# Patient Record
Sex: MALE | Race: WHITE | NOT HISPANIC OR LATINO
[De-identification: names, ages, dates, MRNs, and addresses within clinical notes are randomized per-mention and may not be internally consistent; named-entity substitution may affect disease eponyms.]

---

## 2018-06-26 PROBLEM — Z82.49 FAMILY HISTORY OF HYPERTENSION: Status: ACTIVE | Noted: 2018-06-26

## 2018-06-26 PROBLEM — Z83.42 FAMILY HISTORY OF HYPERCHOLESTEROLEMIA: Status: ACTIVE | Noted: 2018-06-26

## 2018-06-26 PROBLEM — E78.5 HYPERLIPIDEMIA: Status: ACTIVE | Noted: 2018-06-26

## 2018-06-26 PROBLEM — Z83.3 FAMILY HISTORY OF DIABETES MELLITUS: Status: ACTIVE | Noted: 2018-06-26

## 2018-06-26 PROBLEM — I10 HYPERTENSION: Status: ACTIVE | Noted: 2018-06-26

## 2018-06-26 PROBLEM — Z82.49 FAMILY HISTORY OF DISSECTING AORTIC ANEURYSM: Status: ACTIVE | Noted: 2018-06-26

## 2018-06-26 RX ORDER — ASPIRIN 325 MG/1
325 TABLET, FILM COATED ORAL DAILY
Refills: 0 | Status: ACTIVE | COMMUNITY

## 2018-06-26 RX ORDER — TADALAFIL 5 MG/1
5 TABLET, FILM COATED ORAL
Qty: 30 | Refills: 0 | Status: DISCONTINUED | COMMUNITY
Start: 2017-06-23 | End: 2018-06-26

## 2018-06-26 RX ORDER — AZITHROMYCIN 250 MG/1
250 TABLET, FILM COATED ORAL
Qty: 6 | Refills: 0 | Status: DISCONTINUED | COMMUNITY
Start: 2017-12-28

## 2018-06-26 RX ORDER — METOPROLOL SUCCINATE 200 MG/1
200 TABLET, EXTENDED RELEASE ORAL
Qty: 15 | Refills: 0 | Status: DISCONTINUED | COMMUNITY
Start: 2017-12-14

## 2018-06-26 RX ORDER — ROSUVASTATIN CALCIUM 40 MG/1
40 TABLET, FILM COATED ORAL
Qty: 30 | Refills: 0 | Status: DISCONTINUED | COMMUNITY
Start: 2017-12-12

## 2018-06-26 RX ORDER — TADALAFIL 5 MG/1
5 TABLET, FILM COATED ORAL
Qty: 30 | Refills: 0 | Status: DISCONTINUED | COMMUNITY
Start: 2017-06-23

## 2018-06-26 RX ORDER — AMLODIPINE BESYLATE 5 MG/1
5 TABLET ORAL
Qty: 30 | Refills: 0 | Status: DISCONTINUED | COMMUNITY
Start: 2018-05-07

## 2018-06-26 RX ORDER — ROSUVASTATIN CALCIUM 40 MG/1
40 TABLET, FILM COATED ORAL
Qty: 30 | Refills: 0 | Status: ACTIVE | COMMUNITY
Start: 2017-12-12

## 2018-06-26 RX ORDER — MULTIVITAMIN
TABLET ORAL DAILY
Refills: 0 | Status: ACTIVE | COMMUNITY

## 2018-06-26 RX ORDER — EZETIMIBE 10 MG/1
10 TABLET ORAL
Qty: 30 | Refills: 0 | Status: DISCONTINUED | COMMUNITY
Start: 2017-06-27

## 2018-06-28 ENCOUNTER — APPOINTMENT (OUTPATIENT)
Dept: CARDIOTHORACIC SURGERY | Facility: CLINIC | Age: 58
End: 2018-06-28
Payer: COMMERCIAL

## 2018-06-28 ENCOUNTER — OUTPATIENT (OUTPATIENT)
Dept: OUTPATIENT SERVICES | Facility: HOSPITAL | Age: 58
LOS: 1 days | End: 2018-06-28
Payer: COMMERCIAL

## 2018-06-28 VITALS
WEIGHT: 246 LBS | HEART RATE: 58 BPM | DIASTOLIC BLOOD PRESSURE: 82 MMHG | SYSTOLIC BLOOD PRESSURE: 148 MMHG | RESPIRATION RATE: 16 BRPM | BODY MASS INDEX: 29.05 KG/M2 | HEIGHT: 77 IN | TEMPERATURE: 98.5 F | OXYGEN SATURATION: 98 %

## 2018-06-28 VITALS — DIASTOLIC BLOOD PRESSURE: 79 MMHG | SYSTOLIC BLOOD PRESSURE: 150 MMHG

## 2018-06-28 VITALS
DIASTOLIC BLOOD PRESSURE: 82 MMHG | RESPIRATION RATE: 15 BRPM | OXYGEN SATURATION: 98 % | SYSTOLIC BLOOD PRESSURE: 150 MMHG | HEIGHT: 77 IN | HEART RATE: 58 BPM | WEIGHT: 242.07 LBS | TEMPERATURE: 99 F

## 2018-06-28 DIAGNOSIS — Z29.9 ENCOUNTER FOR PROPHYLACTIC MEASURES, UNSPECIFIED: ICD-10-CM

## 2018-06-28 DIAGNOSIS — Z83.3 FAMILY HISTORY OF DIABETES MELLITUS: ICD-10-CM

## 2018-06-28 DIAGNOSIS — Z60.2 PROBLEMS RELATED TO LIVING ALONE: ICD-10-CM

## 2018-06-28 DIAGNOSIS — I10 ESSENTIAL (PRIMARY) HYPERTENSION: ICD-10-CM

## 2018-06-28 DIAGNOSIS — Z83.42 FAMILY HISTORY OF FAMILIAL HYPERCHOLESTEROLEMIA: ICD-10-CM

## 2018-06-28 DIAGNOSIS — E78.5 HYPERLIPIDEMIA, UNSPECIFIED: ICD-10-CM

## 2018-06-28 DIAGNOSIS — I20.9 ANGINA PECTORIS, UNSPECIFIED: ICD-10-CM

## 2018-06-28 DIAGNOSIS — Z82.49 FAMILY HISTORY OF ISCHEMIC HEART DISEASE AND OTHER DISEASES OF THE CIRCULATORY SYSTEM: ICD-10-CM

## 2018-06-28 DIAGNOSIS — I25.10 ATHEROSCLEROTIC HEART DISEASE OF NATIVE CORONARY ARTERY WITHOUT ANGINA PECTORIS: ICD-10-CM

## 2018-06-28 LAB
ANION GAP SERPL CALC-SCNC: 13 MMOL/L — SIGNIFICANT CHANGE UP (ref 5–17)
BLD GP AB SCN SERPL QL: NEGATIVE — SIGNIFICANT CHANGE UP
BUN SERPL-MCNC: 17 MG/DL — SIGNIFICANT CHANGE UP (ref 7–23)
CALCIUM SERPL-MCNC: 9.8 MG/DL — SIGNIFICANT CHANGE UP (ref 8.4–10.5)
CHLORIDE SERPL-SCNC: 101 MMOL/L — SIGNIFICANT CHANGE UP (ref 96–108)
CO2 SERPL-SCNC: 29 MMOL/L — SIGNIFICANT CHANGE UP (ref 22–31)
CREAT SERPL-MCNC: 1.2 MG/DL — SIGNIFICANT CHANGE UP (ref 0.5–1.3)
GLUCOSE SERPL-MCNC: 85 MG/DL — SIGNIFICANT CHANGE UP (ref 70–99)
HCT VFR BLD CALC: 46.6 % — SIGNIFICANT CHANGE UP (ref 39–50)
HGB BLD-MCNC: 16.3 G/DL — SIGNIFICANT CHANGE UP (ref 13–17)
MCHC RBC-ENTMCNC: 30.6 PG — SIGNIFICANT CHANGE UP (ref 27–34)
MCHC RBC-ENTMCNC: 35 GM/DL — SIGNIFICANT CHANGE UP (ref 32–36)
MCV RBC AUTO: 87.4 FL — SIGNIFICANT CHANGE UP (ref 80–100)
PLATELET # BLD AUTO: 240 K/UL — SIGNIFICANT CHANGE UP (ref 150–400)
POTASSIUM SERPL-MCNC: 4.4 MMOL/L — SIGNIFICANT CHANGE UP (ref 3.5–5.3)
POTASSIUM SERPL-SCNC: 4.4 MMOL/L — SIGNIFICANT CHANGE UP (ref 3.5–5.3)
RBC # BLD: 5.33 M/UL — SIGNIFICANT CHANGE UP (ref 4.2–5.8)
RBC # FLD: 12.9 % — SIGNIFICANT CHANGE UP (ref 10.3–14.5)
RH IG SCN BLD-IMP: POSITIVE — SIGNIFICANT CHANGE UP
SODIUM SERPL-SCNC: 143 MMOL/L — SIGNIFICANT CHANGE UP (ref 135–145)
WBC # BLD: 7.26 K/UL — SIGNIFICANT CHANGE UP (ref 3.8–10.5)
WBC # FLD AUTO: 7.26 K/UL — SIGNIFICANT CHANGE UP (ref 3.8–10.5)

## 2018-06-28 PROCEDURE — 80048 BASIC METABOLIC PNL TOTAL CA: CPT

## 2018-06-28 PROCEDURE — 86901 BLOOD TYPING SEROLOGIC RH(D): CPT

## 2018-06-28 PROCEDURE — 71046 X-RAY EXAM CHEST 2 VIEWS: CPT | Mod: 26

## 2018-06-28 PROCEDURE — 99205 OFFICE O/P NEW HI 60 MIN: CPT

## 2018-06-28 PROCEDURE — 86850 RBC ANTIBODY SCREEN: CPT

## 2018-06-28 PROCEDURE — 87640 STAPH A DNA AMP PROBE: CPT

## 2018-06-28 PROCEDURE — 87641 MR-STAPH DNA AMP PROBE: CPT

## 2018-06-28 PROCEDURE — 83036 HEMOGLOBIN GLYCOSYLATED A1C: CPT

## 2018-06-28 PROCEDURE — 71046 X-RAY EXAM CHEST 2 VIEWS: CPT

## 2018-06-28 PROCEDURE — G0463: CPT

## 2018-06-28 PROCEDURE — 85027 COMPLETE CBC AUTOMATED: CPT

## 2018-06-28 PROCEDURE — 86900 BLOOD TYPING SEROLOGIC ABO: CPT

## 2018-06-28 RX ORDER — FLUTICASONE PROPIONATE 50 UG/1
50 SPRAY, METERED NASAL
Qty: 16 | Refills: 0 | Status: DISCONTINUED | COMMUNITY
Start: 2018-01-22 | End: 2018-06-28

## 2018-06-28 SDOH — SOCIAL STABILITY - SOCIAL INSECURITY: PROBLEMS RELATED TO LIVING ALONE: Z60.2

## 2018-06-28 NOTE — H&P PST ADULT - PMH
GERD (gastroesophageal reflux disease)    HLD (hyperlipidemia)    HTN (hypertension) Atherosclerotic heart disease of native coronary artery without angina pectoris    GERD (gastroesophageal reflux disease)    HLD (hyperlipidemia)    HTN (hypertension)

## 2018-06-28 NOTE — H&P PST ADULT - HISTORY OF PRESENT ILLNESS
58 y/o male with 58 y/o male with PMHx of HTN, HLD, 58 y/o male with PMHx of HTN, HLD, GERD c/o slight chest burning and SOB while walking uphill about 1 month ago. s/p cardiac catheterization revealed TVD. Presents to PST for a scheduled CABG x 3 on 7/3/2018.

## 2018-06-28 NOTE — H&P PST ADULT - PROBLEM SELECTOR PLAN 1
Scheduled CABG x 3 on 7/3/2018  CBC, BMP, A1c, nasal swab, T&S sent at [facility]  Pt to stop Aspirin 325mg 1wk pre-op per Dr. Sims, last dose 6/27  CXR completed today  Pre-op instructions given to pt including chlorhexidene soap Scheduled CABG x 3 on 7/3/2018  CBC, BMP, A1c, nasal swab, T&S sent at [facility]  Pt to stop Aspirin 325mg 1wk pre-op per Dr. Sims, last dose 6/27  CXR completed today  Recent EKG from 6/18 reviewed and in chart- Sinus Bradycardia  Pre-op instructions given to pt including chlorhexidene soap  FS DOS

## 2018-06-28 NOTE — H&P PST ADULT - ASSESSMENT
CAPRINI SCORE [CLOT]    AGE RELATED RISK FACTORS                                                       MOBILITY RELATED FACTORS  [x] Age 41-60 years                                            (1 Point)                  [ ] Bed rest                                                        (1 Point)  [ ] Age: 61-74 years                                           (2 Points)                 [ ] Plaster cast                                                   (2 Points)  [ ] Age= 75 years                                              (3 Points)                 [ ] Bed bound for more than 72 hours                 (2 Points)    DISEASE RELATED RISK FACTORS                                               GENDER SPECIFIC FACTORS  [ ] Edema in the lower extremities                       (1 Point)                  [ ] Pregnancy                                                     (1 Point)  [ ] Varicose veins                                               (1 Point)                  [ ] Post-partum < 6 weeks                                   (1 Point)             [x] BMI > 25 Kg/m2                                            (1 Point)                  [ ] Hormonal therapy  or oral contraception          (1 Point)                 [ ] Sepsis (in the previous month)                        (1 Point)                  [ ] History of pregnancy complications                 (1 point)  [ ] Pneumonia or serious lung disease                                               [ ] Unexplained or recurrent                     (1 Point)           (in the previous month)                               (1 Point)  [ ] Abnormal pulmonary function test                     (1 Point)                 SURGERY RELATED RISK FACTORS  [ ] Acute myocardial infarction                              (1 Point)                 [ ]  Section                                             (1 Point)  [ ] Congestive heart failure (in the previous month)  (1 Point)               [ ] Minor surgery                                                  (1 Point)   [ ] Inflammatory bowel disease                             (1 Point)                 [ ] Arthroscopic surgery                                        (2 Points)  [ ] Central venous access                                      (2 Points)                [x] General surgery lasting more than 45 minutes   (2 Points)       [ ] Stroke (in the previous month)                          (5 Points)               [ ] Elective arthroplasty                                         (5 Points)                                                                                                                                               HEMATOLOGY RELATED FACTORS                                                 TRAUMA RELATED RISK FACTORS  [ ] Prior episodes of VTE                                     (3 Points)                [ ] Fracture of the hip, pelvis, or leg                       (5 Points)  [ ] Positive family history for VTE                         (3 Points)                 [ ] Acute spinal cord injury (in the previous month)  (5 Points)  [ ] Prothrombin 32954 A                                     (3 Points)                 [ ] Paralysis  (less than 1 month)                             (5 Points)  [ ] Factor V Leiden                                             (3 Points)                  [ ] Multiple Trauma within 1 month                        (5 Points)  [ ] Lupus anticoagulants                                     (3 Points)                                                           [ ] Anticardiolipin antibodies                               (3 Points)                                                       [ ] High homocysteine in the blood                      (3 Points)                                             [ ] Other congenital or acquired thrombophilia      (3 Points)                                                [ ] Heparin induced thrombocytopenia                  (3 Points)                                          Total Score [  4    ]

## 2018-06-28 NOTE — H&P PST ADULT - PROBLEM SELECTOR PLAN 2
Instructed pt to c/w antihypertensives pre-op Instructed pt to c/w antihypertensives pre-operatively

## 2018-06-28 NOTE — H&P PST ADULT - NSANTHOSAYNRD_GEN_A_CORE
No. HARSHA screening performed.  STOP BANG Legend: 0-2 = LOW Risk; 3-4 = INTERMEDIATE Risk; 5-8 = HIGH Risk

## 2018-06-29 ENCOUNTER — TRANSCRIPTION ENCOUNTER (OUTPATIENT)
Age: 58
End: 2018-06-29

## 2018-06-29 LAB
HBA1C BLD-MCNC: 5.6 % — SIGNIFICANT CHANGE UP (ref 4–5.6)
MRSA PCR RESULT.: SIGNIFICANT CHANGE UP
S AUREUS DNA NOSE QL NAA+PROBE: SIGNIFICANT CHANGE UP

## 2018-07-02 PROBLEM — I20.9 ANGINA PECTORIS: Status: ACTIVE | Noted: 2018-06-26

## 2018-07-03 ENCOUNTER — INPATIENT (INPATIENT)
Facility: HOSPITAL | Age: 58
LOS: 6 days | Discharge: HOME | DRG: 235 | End: 2018-07-10
Attending: THORACIC SURGERY (CARDIOTHORACIC VASCULAR SURGERY) | Admitting: THORACIC SURGERY (CARDIOTHORACIC VASCULAR SURGERY)
Payer: COMMERCIAL

## 2018-07-03 ENCOUNTER — APPOINTMENT (OUTPATIENT)
Dept: CARDIOTHORACIC SURGERY | Facility: HOSPITAL | Age: 58
End: 2018-07-03

## 2018-07-03 VITALS
TEMPERATURE: 98 F | WEIGHT: 241.19 LBS | OXYGEN SATURATION: 98 % | DIASTOLIC BLOOD PRESSURE: 66 MMHG | SYSTOLIC BLOOD PRESSURE: 120 MMHG | RESPIRATION RATE: 18 BRPM | HEART RATE: 54 BPM | HEIGHT: 77 IN

## 2018-07-03 DIAGNOSIS — I25.10 ATHEROSCLEROTIC HEART DISEASE OF NATIVE CORONARY ARTERY WITHOUT ANGINA PECTORIS: ICD-10-CM

## 2018-07-03 LAB
ALBUMIN SERPL ELPH-MCNC: 3.8 G/DL — SIGNIFICANT CHANGE UP (ref 3.3–5)
ALP SERPL-CCNC: 57 U/L — SIGNIFICANT CHANGE UP (ref 40–120)
ALT FLD-CCNC: 30 U/L — SIGNIFICANT CHANGE UP (ref 10–45)
ANION GAP SERPL CALC-SCNC: 16 MMOL/L — SIGNIFICANT CHANGE UP (ref 5–17)
APTT BLD: 28.6 SEC — SIGNIFICANT CHANGE UP (ref 27.5–37.4)
AST SERPL-CCNC: 32 U/L — SIGNIFICANT CHANGE UP (ref 10–40)
BASOPHILS # BLD AUTO: 0 K/UL — SIGNIFICANT CHANGE UP (ref 0–0.2)
BASOPHILS NFR BLD AUTO: 0 % — SIGNIFICANT CHANGE UP (ref 0–2)
BILIRUB SERPL-MCNC: 2.4 MG/DL — HIGH (ref 0.2–1.2)
BUN SERPL-MCNC: 15 MG/DL — SIGNIFICANT CHANGE UP (ref 7–23)
CALCIUM SERPL-MCNC: 9.5 MG/DL — SIGNIFICANT CHANGE UP (ref 8.4–10.5)
CHLORIDE SERPL-SCNC: 101 MMOL/L — SIGNIFICANT CHANGE UP (ref 96–108)
CK MB BLD-MCNC: 5.9 % — HIGH (ref 0–3.5)
CK MB CFR SERPL CALC: 11.1 NG/ML — HIGH (ref 0–6.7)
CK SERPL-CCNC: 187 U/L — SIGNIFICANT CHANGE UP (ref 30–200)
CO2 SERPL-SCNC: 23 MMOL/L — SIGNIFICANT CHANGE UP (ref 22–31)
CREAT SERPL-MCNC: 1.02 MG/DL — SIGNIFICANT CHANGE UP (ref 0.5–1.3)
EOSINOPHIL # BLD AUTO: 0.1 K/UL — SIGNIFICANT CHANGE UP (ref 0–0.5)
EOSINOPHIL NFR BLD AUTO: 3 % — SIGNIFICANT CHANGE UP (ref 0–6)
FIBRINOGEN PPP-MCNC: 301 MG/DL — LOW (ref 310–510)
GAS PNL BLDA: SIGNIFICANT CHANGE UP
GLUCOSE BLDC GLUCOMTR-MCNC: 119 MG/DL — HIGH (ref 70–99)
GLUCOSE BLDC GLUCOMTR-MCNC: 131 MG/DL — HIGH (ref 70–99)
GLUCOSE BLDC GLUCOMTR-MCNC: 175 MG/DL — HIGH (ref 70–99)
GLUCOSE SERPL-MCNC: 168 MG/DL — HIGH (ref 70–99)
HCT VFR BLD CALC: 37.4 % — LOW (ref 39–50)
HGB BLD-MCNC: 13.8 G/DL — SIGNIFICANT CHANGE UP (ref 13–17)
INR BLD: 1.26 RATIO — HIGH (ref 0.88–1.16)
LYMPHOCYTES # BLD AUTO: 1.4 K/UL — SIGNIFICANT CHANGE UP (ref 1–3.3)
LYMPHOCYTES # BLD AUTO: 10 % — LOW (ref 13–44)
MAGNESIUM SERPL-MCNC: 2.3 MG/DL — SIGNIFICANT CHANGE UP (ref 1.6–2.6)
MCHC RBC-ENTMCNC: 32.5 PG — SIGNIFICANT CHANGE UP (ref 27–34)
MCHC RBC-ENTMCNC: 36.9 GM/DL — HIGH (ref 32–36)
MCV RBC AUTO: 88.1 FL — SIGNIFICANT CHANGE UP (ref 80–100)
MONOCYTES # BLD AUTO: 0.4 K/UL — SIGNIFICANT CHANGE UP (ref 0–0.9)
MONOCYTES NFR BLD AUTO: 2 % — SIGNIFICANT CHANGE UP (ref 2–14)
NEUTROPHILS # BLD AUTO: 9.1 K/UL — HIGH (ref 1.8–7.4)
NEUTROPHILS NFR BLD AUTO: 80 % — HIGH (ref 43–77)
PHOSPHATE SERPL-MCNC: 1.4 MG/DL — LOW (ref 2.5–4.5)
PLATELET # BLD AUTO: 165 K/UL — SIGNIFICANT CHANGE UP (ref 150–400)
POTASSIUM SERPL-MCNC: 3.7 MMOL/L — SIGNIFICANT CHANGE UP (ref 3.5–5.3)
POTASSIUM SERPL-SCNC: 3.7 MMOL/L — SIGNIFICANT CHANGE UP (ref 3.5–5.3)
PROT SERPL-MCNC: 5.6 G/DL — LOW (ref 6–8.3)
PROTHROM AB SERPL-ACNC: 13.7 SEC — HIGH (ref 9.8–12.7)
RBC # BLD: 4.25 M/UL — SIGNIFICANT CHANGE UP (ref 4.2–5.8)
RBC # FLD: 11.6 % — SIGNIFICANT CHANGE UP (ref 10.3–14.5)
SODIUM SERPL-SCNC: 140 MMOL/L — SIGNIFICANT CHANGE UP (ref 135–145)
TROPONIN T, HIGH SENSITIVITY RESULT: 202 NG/L — HIGH (ref 0–51)
WBC # BLD: 10.8 K/UL — HIGH (ref 3.8–10.5)
WBC # FLD AUTO: 10.8 K/UL — HIGH (ref 3.8–10.5)

## 2018-07-03 PROCEDURE — 93010 ELECTROCARDIOGRAM REPORT: CPT

## 2018-07-03 PROCEDURE — 33534 CABG ARTERIAL TWO: CPT

## 2018-07-03 PROCEDURE — 99291 CRITICAL CARE FIRST HOUR: CPT

## 2018-07-03 PROCEDURE — 71045 X-RAY EXAM CHEST 1 VIEW: CPT | Mod: 26

## 2018-07-03 PROCEDURE — 33534 CABG ARTERIAL TWO: CPT | Mod: AS

## 2018-07-03 RX ORDER — ACETAMINOPHEN 500 MG
650 TABLET ORAL EVERY 6 HOURS
Qty: 0 | Refills: 0 | Status: DISCONTINUED | OUTPATIENT
Start: 2018-07-03 | End: 2018-07-03

## 2018-07-03 RX ORDER — METOCLOPRAMIDE HCL 10 MG
10 TABLET ORAL EVERY 8 HOURS
Qty: 0 | Refills: 0 | Status: DISCONTINUED | OUTPATIENT
Start: 2018-07-03 | End: 2018-07-03

## 2018-07-03 RX ORDER — ASPIRIN/CALCIUM CARB/MAGNESIUM 324 MG
81 TABLET ORAL DAILY
Qty: 0 | Refills: 0 | Status: DISCONTINUED | OUTPATIENT
Start: 2018-07-03 | End: 2018-07-03

## 2018-07-03 RX ORDER — POTASSIUM CHLORIDE 20 MEQ
10 PACKET (EA) ORAL
Qty: 0 | Refills: 0 | Status: DISCONTINUED | OUTPATIENT
Start: 2018-07-03 | End: 2018-07-03

## 2018-07-03 RX ORDER — OMEPRAZOLE 10 MG/1
1 CAPSULE, DELAYED RELEASE ORAL
Qty: 0 | Refills: 0 | COMMUNITY

## 2018-07-03 RX ORDER — CHLORHEXIDINE GLUCONATE 213 G/1000ML
5 SOLUTION TOPICAL EVERY 4 HOURS
Qty: 0 | Refills: 0 | Status: DISCONTINUED | OUTPATIENT
Start: 2018-07-03 | End: 2018-07-03

## 2018-07-03 RX ORDER — ASPIRIN/CALCIUM CARB/MAGNESIUM 324 MG
300 TABLET ORAL ONCE
Qty: 0 | Refills: 0 | Status: DISCONTINUED | OUTPATIENT
Start: 2018-07-03 | End: 2018-07-04

## 2018-07-03 RX ORDER — DEXTROSE 50 % IN WATER 50 %
25 SYRINGE (ML) INTRAVENOUS
Qty: 0 | Refills: 0 | Status: DISCONTINUED | OUTPATIENT
Start: 2018-07-03 | End: 2018-07-05

## 2018-07-03 RX ORDER — MEPERIDINE HYDROCHLORIDE 50 MG/ML
25 INJECTION INTRAMUSCULAR; INTRAVENOUS; SUBCUTANEOUS ONCE
Qty: 0 | Refills: 0 | Status: DISCONTINUED | OUTPATIENT
Start: 2018-07-03 | End: 2018-07-03

## 2018-07-03 RX ORDER — SODIUM CHLORIDE 9 MG/ML
1000 INJECTION INTRAMUSCULAR; INTRAVENOUS; SUBCUTANEOUS
Qty: 0 | Refills: 0 | Status: DISCONTINUED | OUTPATIENT
Start: 2018-07-03 | End: 2018-07-05

## 2018-07-03 RX ORDER — SODIUM CHLORIDE 9 MG/ML
500 INJECTION, SOLUTION INTRAVENOUS ONCE
Qty: 0 | Refills: 0 | Status: COMPLETED | OUTPATIENT
Start: 2018-07-03 | End: 2018-07-03

## 2018-07-03 RX ORDER — MEPERIDINE HYDROCHLORIDE 50 MG/ML
25 INJECTION INTRAMUSCULAR; INTRAVENOUS; SUBCUTANEOUS ONCE
Qty: 0 | Refills: 0 | Status: DISCONTINUED | OUTPATIENT
Start: 2018-07-03 | End: 2018-07-04

## 2018-07-03 RX ORDER — POTASSIUM CHLORIDE 20 MEQ
10 PACKET (EA) ORAL
Qty: 0 | Refills: 0 | Status: DISCONTINUED | OUTPATIENT
Start: 2018-07-03 | End: 2018-07-04

## 2018-07-03 RX ORDER — HYDROMORPHONE HYDROCHLORIDE 2 MG/ML
30 INJECTION INTRAMUSCULAR; INTRAVENOUS; SUBCUTANEOUS
Qty: 0 | Refills: 0 | Status: DISCONTINUED | OUTPATIENT
Start: 2018-07-03 | End: 2018-07-05

## 2018-07-03 RX ORDER — LIDOCAINE HCL 20 MG/ML
0.2 VIAL (ML) INJECTION ONCE
Qty: 0 | Refills: 0 | Status: DISCONTINUED | OUTPATIENT
Start: 2018-07-03 | End: 2018-07-03

## 2018-07-03 RX ORDER — DEXTROSE 50 % IN WATER 50 %
50 SYRINGE (ML) INTRAVENOUS
Qty: 0 | Refills: 0 | Status: DISCONTINUED | OUTPATIENT
Start: 2018-07-03 | End: 2018-07-03

## 2018-07-03 RX ORDER — ALBUMIN HUMAN 25 %
250 VIAL (ML) INTRAVENOUS ONCE
Qty: 0 | Refills: 0 | Status: COMPLETED | OUTPATIENT
Start: 2018-07-03 | End: 2018-07-03

## 2018-07-03 RX ORDER — POTASSIUM CHLORIDE 20 MEQ
10 PACKET (EA) ORAL
Qty: 0 | Refills: 0 | Status: COMPLETED | OUTPATIENT
Start: 2018-07-03 | End: 2018-07-03

## 2018-07-03 RX ORDER — DEXTROSE 50 % IN WATER 50 %
25 SYRINGE (ML) INTRAVENOUS
Qty: 0 | Refills: 0 | Status: DISCONTINUED | OUTPATIENT
Start: 2018-07-03 | End: 2018-07-03

## 2018-07-03 RX ORDER — NICARDIPINE HYDROCHLORIDE 30 MG/1
1 CAPSULE, EXTENDED RELEASE ORAL
Qty: 40 | Refills: 0 | Status: DISCONTINUED | OUTPATIENT
Start: 2018-07-03 | End: 2018-07-04

## 2018-07-03 RX ORDER — SODIUM CHLORIDE 9 MG/ML
1000 INJECTION INTRAMUSCULAR; INTRAVENOUS; SUBCUTANEOUS
Qty: 0 | Refills: 0 | Status: DISCONTINUED | OUTPATIENT
Start: 2018-07-03 | End: 2018-07-03

## 2018-07-03 RX ORDER — DOCUSATE SODIUM 100 MG
100 CAPSULE ORAL THREE TIMES A DAY
Qty: 0 | Refills: 0 | Status: DISCONTINUED | OUTPATIENT
Start: 2018-07-03 | End: 2018-07-05

## 2018-07-03 RX ORDER — HYDROMORPHONE HYDROCHLORIDE 2 MG/ML
0.5 INJECTION INTRAMUSCULAR; INTRAVENOUS; SUBCUTANEOUS
Qty: 0 | Refills: 0 | Status: DISCONTINUED | OUTPATIENT
Start: 2018-07-03 | End: 2018-07-05

## 2018-07-03 RX ORDER — CEFUROXIME AXETIL 250 MG
1500 TABLET ORAL ONCE
Qty: 0 | Refills: 0 | Status: DISCONTINUED | OUTPATIENT
Start: 2018-07-03 | End: 2018-07-03

## 2018-07-03 RX ORDER — POTASSIUM PHOSPHATE, MONOBASIC POTASSIUM PHOSPHATE, DIBASIC 236; 224 MG/ML; MG/ML
15 INJECTION, SOLUTION INTRAVENOUS ONCE
Qty: 0 | Refills: 0 | Status: COMPLETED | OUTPATIENT
Start: 2018-07-03 | End: 2018-07-03

## 2018-07-03 RX ORDER — EZETIMIBE 10 MG/1
1 TABLET ORAL
Qty: 0 | Refills: 0 | COMMUNITY

## 2018-07-03 RX ORDER — INSULIN HUMAN 100 [IU]/ML
1 INJECTION, SOLUTION SUBCUTANEOUS
Qty: 100 | Refills: 0 | Status: DISCONTINUED | OUTPATIENT
Start: 2018-07-03 | End: 2018-07-04

## 2018-07-03 RX ORDER — FENTANYL CITRATE 50 UG/ML
50 INJECTION INTRAVENOUS ONCE
Qty: 0 | Refills: 0 | Status: DISCONTINUED | OUTPATIENT
Start: 2018-07-03 | End: 2018-07-03

## 2018-07-03 RX ORDER — PANTOPRAZOLE SODIUM 20 MG/1
40 TABLET, DELAYED RELEASE ORAL DAILY
Qty: 0 | Refills: 0 | Status: DISCONTINUED | OUTPATIENT
Start: 2018-07-03 | End: 2018-07-04

## 2018-07-03 RX ORDER — ROSUVASTATIN CALCIUM 5 MG/1
1 TABLET ORAL
Qty: 0 | Refills: 0 | COMMUNITY

## 2018-07-03 RX ORDER — DEXTROSE 50 % IN WATER 50 %
50 SYRINGE (ML) INTRAVENOUS
Qty: 0 | Refills: 0 | Status: DISCONTINUED | OUTPATIENT
Start: 2018-07-03 | End: 2018-07-05

## 2018-07-03 RX ORDER — SODIUM CHLORIDE 9 MG/ML
3 INJECTION INTRAMUSCULAR; INTRAVENOUS; SUBCUTANEOUS EVERY 8 HOURS
Qty: 0 | Refills: 0 | Status: DISCONTINUED | OUTPATIENT
Start: 2018-07-03 | End: 2018-07-03

## 2018-07-03 RX ORDER — PANTOPRAZOLE SODIUM 20 MG/1
40 TABLET, DELAYED RELEASE ORAL DAILY
Qty: 0 | Refills: 0 | Status: DISCONTINUED | OUTPATIENT
Start: 2018-07-03 | End: 2018-07-03

## 2018-07-03 RX ORDER — MAGNESIUM SULFATE 500 MG/ML
1 VIAL (ML) INJECTION ONCE
Qty: 0 | Refills: 0 | Status: COMPLETED | OUTPATIENT
Start: 2018-07-03 | End: 2018-07-03

## 2018-07-03 RX ORDER — DOCUSATE SODIUM 100 MG
100 CAPSULE ORAL THREE TIMES A DAY
Qty: 0 | Refills: 0 | Status: DISCONTINUED | OUTPATIENT
Start: 2018-07-03 | End: 2018-07-03

## 2018-07-03 RX ORDER — DEXMEDETOMIDINE HYDROCHLORIDE IN 0.9% SODIUM CHLORIDE 4 UG/ML
0.1 INJECTION INTRAVENOUS
Qty: 200 | Refills: 0 | Status: DISCONTINUED | OUTPATIENT
Start: 2018-07-03 | End: 2018-07-04

## 2018-07-03 RX ORDER — CEFUROXIME AXETIL 250 MG
1500 TABLET ORAL EVERY 8 HOURS
Qty: 0 | Refills: 0 | Status: COMPLETED | OUTPATIENT
Start: 2018-07-03 | End: 2018-07-05

## 2018-07-03 RX ORDER — METOCLOPRAMIDE HCL 10 MG
10 TABLET ORAL EVERY 8 HOURS
Qty: 0 | Refills: 0 | Status: DISCONTINUED | OUTPATIENT
Start: 2018-07-03 | End: 2018-07-04

## 2018-07-03 RX ORDER — NALOXONE HYDROCHLORIDE 4 MG/.1ML
0.1 SPRAY NASAL
Qty: 0 | Refills: 0 | Status: DISCONTINUED | OUTPATIENT
Start: 2018-07-03 | End: 2018-07-05

## 2018-07-03 RX ORDER — ONDANSETRON 8 MG/1
4 TABLET, FILM COATED ORAL EVERY 6 HOURS
Qty: 0 | Refills: 0 | Status: DISCONTINUED | OUTPATIENT
Start: 2018-07-03 | End: 2018-07-05

## 2018-07-03 RX ORDER — ASPIRIN/CALCIUM CARB/MAGNESIUM 324 MG
325 TABLET ORAL DAILY
Qty: 0 | Refills: 0 | Status: DISCONTINUED | OUTPATIENT
Start: 2018-07-03 | End: 2018-07-05

## 2018-07-03 RX ORDER — OXYCODONE HYDROCHLORIDE 5 MG/1
5 TABLET ORAL EVERY 6 HOURS
Qty: 0 | Refills: 0 | Status: DISCONTINUED | OUTPATIENT
Start: 2018-07-03 | End: 2018-07-03

## 2018-07-03 RX ORDER — CHLORHEXIDINE GLUCONATE 213 G/1000ML
5 SOLUTION TOPICAL EVERY 4 HOURS
Qty: 0 | Refills: 0 | Status: DISCONTINUED | OUTPATIENT
Start: 2018-07-03 | End: 2018-07-04

## 2018-07-03 RX ADMIN — SODIUM CHLORIDE 10 MILLILITER(S): 9 INJECTION INTRAMUSCULAR; INTRAVENOUS; SUBCUTANEOUS at 14:49

## 2018-07-03 RX ADMIN — POTASSIUM PHOSPHATE, MONOBASIC POTASSIUM PHOSPHATE, DIBASIC 62.5 MILLIMOLE(S): 236; 224 INJECTION, SOLUTION INTRAVENOUS at 20:26

## 2018-07-03 RX ADMIN — Medication 100 MILLIEQUIVALENT(S): at 17:40

## 2018-07-03 RX ADMIN — Medication 100 MILLIGRAM(S): at 18:04

## 2018-07-03 RX ADMIN — FENTANYL CITRATE 50 MICROGRAM(S): 50 INJECTION INTRAVENOUS at 20:00

## 2018-07-03 RX ADMIN — Medication 125 MILLILITER(S): at 15:20

## 2018-07-03 RX ADMIN — Medication 100 GRAM(S): at 16:38

## 2018-07-03 RX ADMIN — Medication 100 MILLIEQUIVALENT(S): at 15:40

## 2018-07-03 RX ADMIN — Medication 100 MILLIEQUIVALENT(S): at 17:10

## 2018-07-03 RX ADMIN — NICARDIPINE HYDROCHLORIDE 5 MG/HR: 30 CAPSULE, EXTENDED RELEASE ORAL at 16:00

## 2018-07-03 RX ADMIN — Medication 325 MILLIGRAM(S): at 21:31

## 2018-07-03 RX ADMIN — Medication 125 MILLILITER(S): at 15:07

## 2018-07-03 RX ADMIN — SODIUM CHLORIDE 1500 MILLILITER(S): 9 INJECTION, SOLUTION INTRAVENOUS at 16:15

## 2018-07-03 RX ADMIN — Medication 100 MILLIEQUIVALENT(S): at 15:10

## 2018-07-03 RX ADMIN — Medication 100 MILLIEQUIVALENT(S): at 18:40

## 2018-07-03 RX ADMIN — CHLORHEXIDINE GLUCONATE 5 MILLILITER(S): 213 SOLUTION TOPICAL at 18:05

## 2018-07-03 RX ADMIN — Medication 100 MILLIGRAM(S): at 21:31

## 2018-07-03 RX ADMIN — PANTOPRAZOLE SODIUM 40 MILLIGRAM(S): 20 TABLET, DELAYED RELEASE ORAL at 16:11

## 2018-07-03 RX ADMIN — FENTANYL CITRATE 50 MICROGRAM(S): 50 INJECTION INTRAVENOUS at 19:45

## 2018-07-03 RX ADMIN — SODIUM CHLORIDE 3000 MILLILITER(S): 9 INJECTION, SOLUTION INTRAVENOUS at 14:55

## 2018-07-03 RX ADMIN — DEXMEDETOMIDINE HYDROCHLORIDE IN 0.9% SODIUM CHLORIDE 2.73 MICROGRAM(S)/KG/HR: 4 INJECTION INTRAVENOUS at 15:59

## 2018-07-03 RX ADMIN — Medication 100 MILLIEQUIVALENT(S): at 16:10

## 2018-07-03 RX ADMIN — INSULIN HUMAN 1 UNIT(S)/HR: 100 INJECTION, SOLUTION SUBCUTANEOUS at 15:30

## 2018-07-03 RX ADMIN — HYDROMORPHONE HYDROCHLORIDE 30 MILLILITER(S): 2 INJECTION INTRAMUSCULAR; INTRAVENOUS; SUBCUTANEOUS at 21:54

## 2018-07-03 RX ADMIN — Medication 100 MILLIEQUIVALENT(S): at 19:09

## 2018-07-03 RX ADMIN — Medication 100 MILLIEQUIVALENT(S): at 16:40

## 2018-07-03 RX ADMIN — SODIUM CHLORIDE 3000 MILLILITER(S): 9 INJECTION, SOLUTION INTRAVENOUS at 18:18

## 2018-07-03 RX ADMIN — Medication 125 MILLILITER(S): at 18:58

## 2018-07-03 RX ADMIN — Medication 100 MILLIEQUIVALENT(S): at 19:48

## 2018-07-03 NOTE — PRE-OP CHECKLIST - 1.
emotional support provided to patient and family, pre op instruction and orientation to procedure provided to patient and family emotional support provided to patient, pre op instruction and orientation to procedure provided to patient

## 2018-07-03 NOTE — AIRWAY REMOVAL NOTE  ADULT & PEDS - ARTIFICAL AIRWAY REMOVAL COMMENTS
Written order for extubation verified. The patient was identified by full name and birth date compared to the identification band. Present during the procedure was Rocio MANZANO

## 2018-07-03 NOTE — PROGRESS NOTE ADULT - SUBJECTIVE AND OBJECTIVE BOX
STEVO YARBROUGH   MRN#: [id number]     The patient is a 57y Male who was seen, evaluated, & examined with the CTICU staff on rounds and later in the day with a multidisciplinary care plan formulated & implemented.  All available clinical, laboratory, radiographic, pharmacologic, and electrocardiographic data were reviewed & analyzed.      The patient was in the CTICU in critical condition secondary to acute hypoxic respiratory failure-persistent cardiopulmonary dysfunction, hemodynamically significant hypovolemia-shock, hyperlactatemia-acidosis, & stress hyperglycemia.      Respiratory failure required full ventilatory support, the following of ABG’s with A-line monitoring, continuous pulse oximetry monitoring, and an IV Propofol infusion for support & to evaluate for & prevent further decompensation secondary to persistent cardiopulmonary dysfunction.     Invasive hemodynamic monitoring with an A-line was required for the following of continuous MAP/BP monitoring to ensure adequate cardiovascular support and to evaluate for & help prevent decompensation while receiving intermittent volume expansion and an IV Levophed drip secondary to hemodynamically significant hypovolemia-shock and hyperlactatemia-acidosis.       Metabolic stability, stress hyperglycemia, & infection prophylaxis required an IV regular Insulin drip & the following of serial glucose levels to help achieve & maintain euglycemia.      Patient required critical care management and I provided 30 minutes of non-continuous care to the patient.  Discussed at length with the CTICU staff and helped coordinate care.

## 2018-07-03 NOTE — BRIEF OPERATIVE NOTE - PROCEDURE
<<-----Click on this checkbox to enter Procedure CABG  07/03/2018  CABGx2 (MARIA ELENA-LAD, [facility])  [facility]

## 2018-07-03 NOTE — PATIENT PROFILE ADULT. - PMH
Atherosclerotic heart disease of native coronary artery without angina pectoris    GERD (gastroesophageal reflux disease)    HLD (hyperlipidemia)    HTN (hypertension)

## 2018-07-04 DIAGNOSIS — Z95.1 PRESENCE OF AORTOCORONARY BYPASS GRAFT: ICD-10-CM

## 2018-07-04 LAB
ALBUMIN SERPL ELPH-MCNC: 4.3 G/DL — SIGNIFICANT CHANGE UP (ref 3.3–5)
ALP SERPL-CCNC: 45 U/L — SIGNIFICANT CHANGE UP (ref 40–120)
ALT FLD-CCNC: 28 U/L — SIGNIFICANT CHANGE UP (ref 10–45)
ANION GAP SERPL CALC-SCNC: 18 MMOL/L — HIGH (ref 5–17)
APTT BLD: 29.6 SEC — SIGNIFICANT CHANGE UP (ref 27.5–37.4)
AST SERPL-CCNC: 34 U/L — SIGNIFICANT CHANGE UP (ref 10–40)
BILIRUB SERPL-MCNC: 3.3 MG/DL — HIGH (ref 0.2–1.2)
BUN SERPL-MCNC: 13 MG/DL — SIGNIFICANT CHANGE UP (ref 7–23)
CALCIUM SERPL-MCNC: 9.3 MG/DL — SIGNIFICANT CHANGE UP (ref 8.4–10.5)
CHLORIDE SERPL-SCNC: 102 MMOL/L — SIGNIFICANT CHANGE UP (ref 96–108)
CO2 SERPL-SCNC: 21 MMOL/L — LOW (ref 22–31)
CREAT SERPL-MCNC: 1.03 MG/DL — SIGNIFICANT CHANGE UP (ref 0.5–1.3)
GAS PNL BLDA: SIGNIFICANT CHANGE UP
GLUCOSE BLDC GLUCOMTR-MCNC: 106 MG/DL — HIGH (ref 70–99)
GLUCOSE BLDC GLUCOMTR-MCNC: 107 MG/DL — HIGH (ref 70–99)
GLUCOSE BLDC GLUCOMTR-MCNC: 107 MG/DL — HIGH (ref 70–99)
GLUCOSE BLDC GLUCOMTR-MCNC: 109 MG/DL — HIGH (ref 70–99)
GLUCOSE BLDC GLUCOMTR-MCNC: 112 MG/DL — HIGH (ref 70–99)
GLUCOSE BLDC GLUCOMTR-MCNC: 117 MG/DL — HIGH (ref 70–99)
GLUCOSE BLDC GLUCOMTR-MCNC: 124 MG/DL — HIGH (ref 70–99)
GLUCOSE BLDC GLUCOMTR-MCNC: 129 MG/DL — HIGH (ref 70–99)
GLUCOSE BLDC GLUCOMTR-MCNC: 131 MG/DL — HIGH (ref 70–99)
GLUCOSE BLDC GLUCOMTR-MCNC: 132 MG/DL — HIGH (ref 70–99)
GLUCOSE BLDC GLUCOMTR-MCNC: 139 MG/DL — HIGH (ref 70–99)
GLUCOSE BLDC GLUCOMTR-MCNC: 142 MG/DL — HIGH (ref 70–99)
GLUCOSE BLDC GLUCOMTR-MCNC: 148 MG/DL — HIGH (ref 70–99)
GLUCOSE BLDC GLUCOMTR-MCNC: 149 MG/DL — HIGH (ref 70–99)
GLUCOSE BLDC GLUCOMTR-MCNC: 156 MG/DL — HIGH (ref 70–99)
GLUCOSE BLDC GLUCOMTR-MCNC: 96 MG/DL — SIGNIFICANT CHANGE UP (ref 70–99)
GLUCOSE BLDC GLUCOMTR-MCNC: 98 MG/DL — SIGNIFICANT CHANGE UP (ref 70–99)
GLUCOSE SERPL-MCNC: 96 MG/DL — SIGNIFICANT CHANGE UP (ref 70–99)
HCT VFR BLD CALC: 35.2 % — LOW (ref 39–50)
HGB BLD-MCNC: 12.9 G/DL — LOW (ref 13–17)
INR BLD: 1.22 RATIO — HIGH (ref 0.88–1.16)
MAGNESIUM SERPL-MCNC: 1.9 MG/DL — SIGNIFICANT CHANGE UP (ref 1.6–2.6)
MCHC RBC-ENTMCNC: 32.7 PG — SIGNIFICANT CHANGE UP (ref 27–34)
MCHC RBC-ENTMCNC: 36.6 GM/DL — HIGH (ref 32–36)
MCV RBC AUTO: 89.2 FL — SIGNIFICANT CHANGE UP (ref 80–100)
PHOSPHATE SERPL-MCNC: 2.1 MG/DL — LOW (ref 2.5–4.5)
PLATELET # BLD AUTO: 162 K/UL — SIGNIFICANT CHANGE UP (ref 150–400)
POTASSIUM SERPL-MCNC: 4.1 MMOL/L — SIGNIFICANT CHANGE UP (ref 3.5–5.3)
POTASSIUM SERPL-SCNC: 4.1 MMOL/L — SIGNIFICANT CHANGE UP (ref 3.5–5.3)
PROT SERPL-MCNC: 6.2 G/DL — SIGNIFICANT CHANGE UP (ref 6–8.3)
PROTHROM AB SERPL-ACNC: 13.2 SEC — HIGH (ref 9.8–12.7)
RBC # BLD: 3.95 M/UL — LOW (ref 4.2–5.8)
RBC # FLD: 11.6 % — SIGNIFICANT CHANGE UP (ref 10.3–14.5)
SODIUM SERPL-SCNC: 141 MMOL/L — SIGNIFICANT CHANGE UP (ref 135–145)
TSH SERPL-MCNC: 1.02 UIU/ML — SIGNIFICANT CHANGE UP (ref 0.27–4.2)
WBC # BLD: 13.7 K/UL — HIGH (ref 3.8–10.5)
WBC # FLD AUTO: 13.7 K/UL — HIGH (ref 3.8–10.5)

## 2018-07-04 PROCEDURE — 99292 CRITICAL CARE ADDL 30 MIN: CPT

## 2018-07-04 PROCEDURE — 99291 CRITICAL CARE FIRST HOUR: CPT

## 2018-07-04 PROCEDURE — 71045 X-RAY EXAM CHEST 1 VIEW: CPT | Mod: 26

## 2018-07-04 PROCEDURE — 93010 ELECTROCARDIOGRAM REPORT: CPT

## 2018-07-04 RX ORDER — POLYETHYLENE GLYCOL 3350 17 G/17G
17 POWDER, FOR SOLUTION ORAL DAILY
Qty: 0 | Refills: 0 | Status: DISCONTINUED | OUTPATIENT
Start: 2018-07-04 | End: 2018-07-10

## 2018-07-04 RX ORDER — INSULIN LISPRO 100/ML
VIAL (ML) SUBCUTANEOUS
Qty: 0 | Refills: 0 | Status: DISCONTINUED | OUTPATIENT
Start: 2018-07-04 | End: 2018-07-05

## 2018-07-04 RX ORDER — ATORVASTATIN CALCIUM 80 MG/1
40 TABLET, FILM COATED ORAL AT BEDTIME
Qty: 0 | Refills: 0 | Status: DISCONTINUED | OUTPATIENT
Start: 2018-07-04 | End: 2018-07-10

## 2018-07-04 RX ORDER — ENOXAPARIN SODIUM 100 MG/ML
40 INJECTION SUBCUTANEOUS DAILY
Qty: 0 | Refills: 0 | Status: DISCONTINUED | OUTPATIENT
Start: 2018-07-04 | End: 2018-07-10

## 2018-07-04 RX ORDER — PANTOPRAZOLE SODIUM 20 MG/1
40 TABLET, DELAYED RELEASE ORAL
Qty: 0 | Refills: 0 | Status: DISCONTINUED | OUTPATIENT
Start: 2018-07-04 | End: 2018-07-10

## 2018-07-04 RX ORDER — METOPROLOL TARTRATE 50 MG
25 TABLET ORAL
Qty: 0 | Refills: 0 | Status: DISCONTINUED | OUTPATIENT
Start: 2018-07-04 | End: 2018-07-05

## 2018-07-04 RX ORDER — METOCLOPRAMIDE HCL 10 MG
10 TABLET ORAL EVERY 8 HOURS
Qty: 0 | Refills: 0 | Status: COMPLETED | OUTPATIENT
Start: 2018-07-04 | End: 2018-07-06

## 2018-07-04 RX ADMIN — Medication 100 MILLIGRAM(S): at 13:30

## 2018-07-04 RX ADMIN — Medication 2: at 16:55

## 2018-07-04 RX ADMIN — INSULIN HUMAN 1 UNIT(S)/HR: 100 INJECTION, SOLUTION SUBCUTANEOUS at 00:26

## 2018-07-04 RX ADMIN — Medication 100 MILLIGRAM(S): at 22:16

## 2018-07-04 RX ADMIN — Medication 104 MILLIGRAM(S): at 22:30

## 2018-07-04 RX ADMIN — HYDROMORPHONE HYDROCHLORIDE 30 MILLILITER(S): 2 INJECTION INTRAMUSCULAR; INTRAVENOUS; SUBCUTANEOUS at 07:16

## 2018-07-04 RX ADMIN — Medication 104 MILLIGRAM(S): at 05:39

## 2018-07-04 RX ADMIN — Medication 100 MILLIGRAM(S): at 16:55

## 2018-07-04 RX ADMIN — PANTOPRAZOLE SODIUM 40 MILLIGRAM(S): 20 TABLET, DELAYED RELEASE ORAL at 09:16

## 2018-07-04 RX ADMIN — Medication 25 MILLIGRAM(S): at 17:25

## 2018-07-04 RX ADMIN — Medication 25 MILLIGRAM(S): at 09:16

## 2018-07-04 RX ADMIN — Medication 10 MILLIGRAM(S): at 22:16

## 2018-07-04 RX ADMIN — Medication 10 MILLIGRAM(S): at 13:30

## 2018-07-04 RX ADMIN — Medication 325 MILLIGRAM(S): at 13:30

## 2018-07-04 RX ADMIN — Medication 100 MILLIGRAM(S): at 05:39

## 2018-07-04 RX ADMIN — Medication 100 MILLIGRAM(S): at 09:16

## 2018-07-04 RX ADMIN — Medication 100 MILLIGRAM(S): at 00:52

## 2018-07-04 RX ADMIN — HYDROMORPHONE HYDROCHLORIDE 30 MILLILITER(S): 2 INJECTION INTRAMUSCULAR; INTRAVENOUS; SUBCUTANEOUS at 18:25

## 2018-07-04 RX ADMIN — ENOXAPARIN SODIUM 40 MILLIGRAM(S): 100 INJECTION SUBCUTANEOUS at 13:30

## 2018-07-04 RX ADMIN — ATORVASTATIN CALCIUM 40 MILLIGRAM(S): 80 TABLET, FILM COATED ORAL at 22:16

## 2018-07-04 RX ADMIN — POLYETHYLENE GLYCOL 3350 17 GRAM(S): 17 POWDER, FOR SOLUTION ORAL at 13:30

## 2018-07-04 NOTE — PHYSICAL THERAPY INITIAL EVALUATION ADULT - PLANNED THERAPY INTERVENTIONS, PT EVAL
GOAL: Stair Negotiation Training: Patient will be able to negotiate up & down 1 flight of stairs with bilateral rails, step to gait pattern, in 2 weeks./bed mobility training/strengthening/balance training/transfer training/gait training

## 2018-07-04 NOTE — PROGRESS NOTE ADULT - SUBJECTIVE AND OBJECTIVE BOX
Patient discussed on morning rounds with Dr. Sims    Operation / Date: POD 1 C3B     SUBJECTIVE ASSESSMENT:  57y Male PMH CAD, HTN, HLD, GERD now POD 1 C3B    Vital Signs Last 24 Hrs  T(C): 36.4 (04 Jul 2018 23:00), Max: 37.9 (04 Jul 2018 03:00)  T(F): 97.6 (04 Jul 2018 23:00), Max: 100.2 (04 Jul 2018 03:00)  HR: 68 (04 Jul 2018 23:00) (57 - 88)  BP: 119/57 (04 Jul 2018 18:33) (90/50 - 135/61)  BP(mean): 79 (04 Jul 2018 17:00) (65 - 91)  RR: 18 (04 Jul 2018 23:00) (7 - 28)  SpO2: 96% (04 Jul 2018 23:00) (96% - 100%)  I&O's Detail    03 Jul 2018 07:01  -  04 Jul 2018 07:00  --------------------------------------------------------  IN:    Albumin 5%  - 250 mL: 750 mL    dexmedetomidine Infusion: 16.4 mL    insulin Infusion: 54.5 mL    IV PiggyBack: 900 mL    Lactated Ringers IV Bolus: 1500 mL    Oral Fluid: 120 mL    sodium chloride 0.9%.: 170 mL  Total IN: 3510.9 mL    OUT:    Chest Tube: 350 mL    Chest Tube: 130 mL    Chest Tube: 40 mL    Indwelling Catheter - Urethral: 3245 mL    Nasoenteral Tube: 140 mL  Total OUT: 3905 mL    Total NET: -394.1 mL      04 Jul 2018 07:01  -  04 Jul 2018 23:05  --------------------------------------------------------  IN:    insulin Infusion: 21 mL    IV PiggyBack: 100 mL    sodium chloride 0.9%.: 110 mL  Total IN: 231 mL    OUT:    Chest Tube: 20 mL    Chest Tube: 40 mL    Chest Tube: 50 mL    Voided: 650 mL  Total OUT: 760 mL    Total NET: -529 mL    CHEST TUBE:  Yes, B/L pleurals  EPICARDIAL WIRES: Yes  TIE DOWNS: No  CALLAHAN: No    General: NAD  HEENT:  NC/AT  Neuro: A&Ox4, speech clear, no focal deficits noted  Respiratory: B/L BS CTA, no wheeze, no rhonchi, no crackles noted  Cardiovascular: RRR, normal S1S2, no murmur noted  GI: Abd soft, NT/ND, +BSx4Q +BM  Peripheral Vascular:  B/L LE neg edema, 2+ peripheral pulses, no clubbing, cyanosis, varicosities/PVD noted  Musculoskeletal: B/L UE and LE 5/5 strength   Psychiatric: Normal mood, normal affect observed  Skin: Normal exam to inspection and palpation  Incision: MSI c/d/i + stable     LABS:                        12.9   13.7  )-----------( 162      ( 04 Jul 2018 03:00 )             35.2       COUMADIN: No    PT/INR - ( 04 Jul 2018 03:00 )   PT: 13.2 sec;   INR: 1.22 ratio         PTT - ( 04 Jul 2018 03:00 )  PTT:29.6 sec    07-04    141  |  102  |  13  ----------------------------<  96  4.1   |  21<L>  |  1.03    Ca    9.3      04 Jul 2018 03:00  Phos  2.1     07-04  Mg     1.9     07-04    TPro  6.2  /  Alb  4.3  /  TBili  3.3<H>  /  DBili  x   /  AST  34  /  ALT  28  /  AlkPhos  45  07-04      MEDICATIONS  (STANDING):  aspirin enteric coated 325 milliGRAM(s) Oral daily  atorvastatin 40 milliGRAM(s) Oral at bedtime  cefuroxime  IVPB 1500 milliGRAM(s) IV Intermittent every 8 hours  dextrose 50% Injectable 50 milliLiter(s) IV Push every 15 minutes  dextrose 50% Injectable 25 milliLiter(s) IV Push every 15 minutes  docusate sodium 100 milliGRAM(s) Oral three times a day  enoxaparin Injectable 40 milliGRAM(s) SubCutaneous daily  HYDROmorphone PCA (1 mG/mL) 30 milliLiter(s) PCA Continuous PCA Continuous  insulin lispro (HumaLOG) corrective regimen sliding scale   SubCutaneous three times a day before meals  metoclopramide Injectable 10 milliGRAM(s) IV Push every 8 hours  metoprolol tartrate 25 milliGRAM(s) Oral two times a day  pantoprazole    Tablet 40 milliGRAM(s) Oral before breakfast  polyethylene glycol 3350 17 Gram(s) Oral daily  sodium chloride 0.9%. 1000 milliLiter(s) (10 mL/Hr) IV Continuous <Continuous>    MEDICATIONS  (PRN):  HYDROmorphone PCA (1 mG/mL) Rescue Clinician Bolus 0.5 milliGRAM(s) IV Push every 15 minutes PRN for Pain Scale GREATER THAN 6  naloxone Injectable 0.1 milliGRAM(s) IV Push every 3 minutes PRN For ANY of the following changes in patient status:  A. RR LESS THAN 10 breaths per minute, B. Oxygen saturation LESS THAN 90%, C. Sedation score of 6  ondansetron Injectable 4 milliGRAM(s) IV Push every 6 hours PRN Nausea        RADIOLOGY & ADDITIONAL TESTS:

## 2018-07-04 NOTE — PHYSICAL THERAPY INITIAL EVALUATION ADULT - ADDITIONAL COMMENTS
Pt states he lives with his aunt in a split level ranch house with 1 steps to enter and 7 steps inside, +HR. Pt states he is independent with all ADLs and ambulation. Pt states he does not use a AD for ambulation. Pt states his aunt is available to assist when needed. Pt states he wears reading glasses and drives.

## 2018-07-04 NOTE — CONSULT NOTE ADULT - SUBJECTIVE AND OBJECTIVE BOX
CHIEF COMPLAINT:Patient is a 57y old  Male who presents with a chief complaint of "CABG x 3" (03 Jul 2018 07:03)      HISTORY OF PRESENT ILLNESS:HPI:  56 y/o male with PMHx of HTN, HLD, GERD c/o slight chest burning and SOB while walking uphill about 1 month ago. s/p cardiac catheterization revealed TVD. Presents to [facility] for a scheduled CABG x 3 on 7/3/2018.  Now s/p CABG with 2 mammary arteries      PAST MEDICAL & SURGICAL HISTORY:  Atherosclerotic heart disease of native coronary artery without angina pectoris  GERD (gastroesophageal reflux disease)  HLD (hyperlipidemia)  HTN (hypertension)  No significant past surgical history          MEDICATIONS:  enoxaparin Injectable 40 milliGRAM(s) SubCutaneous daily        HYDROmorphone   Tablet 2 milliGRAM(s) Oral every 3 hours PRN  HYDROmorphone   Tablet 4 milliGRAM(s) Oral every 3 hours PRN  metoclopramide Injectable 10 milliGRAM(s) IV Push every 8 hours    pantoprazole    Tablet 40 milliGRAM(s) Oral before breakfast  polyethylene glycol 3350 17 Gram(s) Oral daily    atorvastatin 40 milliGRAM(s) Oral at bedtime        FAMILY HISTORY:      Non-contributory    SOCIAL HISTORY:    not an active smoker  Allergies    No Known Allergies    Intolerances    	    REVIEW OF SYSTEMS:  CONSTITUTIONAL: No fever  EYES: No eye pain, visual disturbances, or discharge  ENMT:  No difficulty hearing, tinnitus  NECK: No pain or stiffness  RESPIRATORY: No cough, wheezing,  CARDIOVASCULAR: + chest incisional pain, no palpitations, passing out, dizziness, or leg swelling  GASTROINTESTINAL:  No nausea, vomiting, diarrhea or constipation. No melena.  GENITOURINARY: No dysuria, hematuria  NEUROLOGICAL: No stroke like symptoms  SKIN: No burning or lesions   LYMPH Nodes: No enlarged glands  ENDOCRINE: No heat or cold intolerance  MUSCULOSKELETAL: No joint pain or swelling  PSYCHIATRIC: No  anxiety, mood swings  HEME/LYMPH: No bleeding gums  ALLERY AND IMMUNOLOGIC: No hives or eczema	    All other ROS negative    PHYSICAL EXAM:  T(C): 37 (07-05-18 @ 20:01), Max: 37 (07-05-18 @ 20:01)  HR: 72 (07-05-18 @ 20:01) (62 - 72)  BP: 116/68 (07-05-18 @ 20:01) (116/68 - 145/73)  RR: 18 (07-05-18 @ 20:01) (17 - 19)  SpO2: 95% (07-05-18 @ 20:01) (94% - 97%)  Wt(kg): --  I&O's Summary    04 Jul 2018 07:01  -  05 Jul 2018 07:00  --------------------------------------------------------  IN: 231 mL / OUT: 1848 mL / NET: -1617 mL    05 Jul 2018 07:01  -  05 Jul 2018 21:57  --------------------------------------------------------  IN: 390 mL / OUT: 835 mL / NET: -445 mL        Appearance: Normal	  HEENT:   Normal oral mucosa, EOMI	  Lymphatic: No lymphadenopathy  Cardiovascular: Normal S1 S2, No JVD, No murmurs, No edema  Respiratory: Lungs clear to auscultation	  Psychiatry: Alert, Mood & affect appropriate  Gastrointestinal:  Soft, Non-tender, + BS	  Skin: No rashes, No ecchymoses, No cyanosis	  Neurologic: Non-focal  Extremities:  No clubbing, cyanosis or edema  Vascular: Peripheral pulses palpable  bilaterally  	    	  	  CARDIAC MARKERS:                          12.2   20.6  )-----------( 159      ( 05 Jul 2018 03:59 )             34.1     07-05    137  |  99  |  22  ----------------------------<  154<H>  4.9   |  24  |  1.11    Ca    9.0      05 Jul 2018 03:59  Phos  2.1     07-04  Mg     1.9     07-04    TPro  6.2  /  Alb  4.3  /  TBili  3.3<H>  /  DBili  x   /  AST  34  /  ALT  28  /  AlkPhos  45  07-04    HgA1c:   TSH:       EKG: nsr with <1mm ST elevations, ?repol  Radiology: sternotomy and surgical wires      Problem/Plan - 1:  ·  Problem: Atherosclerotic heart disease of native coronary artery s/p CABG x 3.  Plan:Doing well off pressor support  Metoprolol as BP tolerates  Statin  Resume ASA    Problem/Plan - 2:  ·  Problem: HTN (hypertension).  Plan: On Metoprolol, will uptitrate as BP tolerates  - off home CCB    Problem/Plan - 3:  HLD - c/w statin      Problem/Plan - 4:  ·  Problem: Need for prophylactic measure.  Plan: On Lovenox       Ovidio Vega DO formerly [facility]  [facility]  [phone] CHIEF COMPLAINT:Patient is a 57y old  Male who presents with a chief complaint of "CABG x 3" (03 Jul 2018 07:03)      HISTORY OF PRESENT ILLNESS:HPI:  56 y/o male with PMHx of HTN, HLD, GERD c/o slight chest burning and SOB while walking uphill about 1 month ago. s/p cardiac catheterization revealed TVD. Presents to [facility] for a scheduled CABG x 3 on 7/3/2018.  Now s/p CABG with 2 mammary arteries      PAST MEDICAL & SURGICAL HISTORY:  Atherosclerotic heart disease of native coronary artery without angina pectoris  GERD (gastroesophageal reflux disease)  HLD (hyperlipidemia)  HTN (hypertension)  No significant past surgical history          MEDICATIONS:  enoxaparin Injectable 40 milliGRAM(s) SubCutaneous daily        HYDROmorphone   Tablet 2 milliGRAM(s) Oral every 3 hours PRN  HYDROmorphone   Tablet 4 milliGRAM(s) Oral every 3 hours PRN  metoclopramide Injectable 10 milliGRAM(s) IV Push every 8 hours    pantoprazole    Tablet 40 milliGRAM(s) Oral before breakfast  polyethylene glycol 3350 17 Gram(s) Oral daily    atorvastatin 40 milliGRAM(s) Oral at bedtime        FAMILY HISTORY:      Non-contributory    SOCIAL HISTORY:    not an active smoker  Allergies    No Known Allergies    Intolerances    	    REVIEW OF SYSTEMS:  CONSTITUTIONAL: No fever  EYES: No eye pain, visual disturbances, or discharge  ENMT:  No difficulty hearing, tinnitus  NECK: No pain or stiffness  RESPIRATORY: No cough, wheezing,  CARDIOVASCULAR: + chest incisional pain, no palpitations, passing out, dizziness, or leg swelling  GASTROINTESTINAL:  No nausea, vomiting, diarrhea or constipation. No melena.  GENITOURINARY: No dysuria, hematuria  NEUROLOGICAL: No stroke like symptoms  SKIN: No burning or lesions   LYMPH Nodes: No enlarged glands  ENDOCRINE: No heat or cold intolerance  MUSCULOSKELETAL: No joint pain or swelling  PSYCHIATRIC: No  anxiety, mood swings  HEME/LYMPH: No bleeding gums  ALLERY AND IMMUNOLOGIC: No hives or eczema	    All other ROS negative    PHYSICAL EXAM:  T(C): 37 (07-05-18 @ 20:01), Max: 37 (07-05-18 @ 20:01)  HR: 72 (07-05-18 @ 20:01) (62 - 72)  BP: 116/68 (07-05-18 @ 20:01) (116/68 - 145/73)  RR: 18 (07-05-18 @ 20:01) (17 - 19)  SpO2: 95% (07-05-18 @ 20:01) (94% - 97%)  Wt(kg): --  I&O's Summary    04 Jul 2018 07:01  -  05 Jul 2018 07:00  --------------------------------------------------------  IN: 231 mL / OUT: 1848 mL / NET: -1617 mL    05 Jul 2018 07:01  -  05 Jul 2018 21:57  --------------------------------------------------------  IN: 390 mL / OUT: 835 mL / NET: -445 mL        Appearance: Normal	  HEENT:   Normal oral mucosa, EOMI	  Lymphatic: No lymphadenopathy  Cardiovascular: Normal S1 S2, No JVD, No murmurs, No edema  Respiratory: Lungs clear to auscultation	  Psychiatry: Alert, Mood & affect appropriate  Gastrointestinal:  Soft, Non-tender, + BS	  Skin: No rashes, No ecchymoses, No cyanosis	  Neurologic: Non-focal  Extremities:  No clubbing, cyanosis or edema  Vascular: Peripheral pulses palpable  bilaterally  	    	  	  CARDIAC MARKERS:                          12.2   20.6  )-----------( 159      ( 05 Jul 2018 03:59 )             34.1     07-05    137  |  99  |  22  ----------------------------<  154<H>  4.9   |  24  |  1.11    Ca    9.0      05 Jul 2018 03:59  Phos  2.1     07-04  Mg     1.9     07-04    TPro  6.2  /  Alb  4.3  /  TBili  3.3<H>  /  DBili  x   /  AST  34  /  ALT  28  /  AlkPhos  45  07-04    HgA1c:   TSH:       EKG: nsr with <1mm ST elevations, ?repol  Radiology: sternotomy and surgical wires      Problem/Plan - 1:  ·  Problem: Atherosclerotic heart disease of native coronary artery s/p CABG x 3.  Plan:Doing well off pressor support  Metoprolol as BP tolerates  Statin  Resume ASA    Problem/Plan - 2:  ·  Problem: HTN (hypertension).  Plan: On Metoprolol, will uptitrate as BP tolerates  - off home CCB    Problem/Plan - 3:  HLD - c/w statin      Problem/Plan - 4:  ·  Problem: Need for prophylactic measure.  Plan: On Lovenox       Ovidio Veag DO [facility]  [facility]  [phone]  a

## 2018-07-04 NOTE — PROGRESS NOTE ADULT - SUBJECTIVE AND OBJECTIVE BOX
Operation: CABG x2 with B/L internal mammary arteries   POD: 1  Narrative: patient resting comfortably in bed    Vital Signs Last 24 Hrs  T(C): 37.9 (03 Jul 2018 23:00), Max: 37.9 (03 Jul 2018 23:00)  T(F): 100.2 (03 Jul 2018 23:00), Max: 100.2 (03 Jul 2018 23:00)  HR: 79 (04 Jul 2018 01:00) (54 - 85)  BP: 135/61 (04 Jul 2018 01:00) (120/60 - 135/61)  BP(mean): 88 (04 Jul 2018 01:00) (84 - 88)  RR: 15 (04 Jul 2018 01:00) (5 - 25)  SpO2: 100% (04 Jul 2018 01:00) (98% - 100%)  I&O's Detail    03 Jul 2018 07:01  -  04 Jul 2018 01:37  --------------------------------------------------------  IN:    Albumin 5%  - 250 mL: 750 mL    dexmedetomidine Infusion: 16.4 mL    insulin Infusion: 42 mL    IV PiggyBack: 900 mL    Lactated Ringers IV Bolus: 1500 mL    Oral Fluid: 120 mL    sodium chloride 0.9%.: 110 mL  Total IN: 3438.4 mL    OUT:    Chest Tube: 85 mL    Chest Tube: 250 mL    Chest Tube: 10 mL    Indwelling Catheter - Urethral: 2595 mL    Nasoenteral Tube: 140 mL  Total OUT: 3080 mL    Total NET: 358.4 mL    LABS:    MEDICATIONS  (STANDING):  aspirin enteric coated 325 milliGRAM(s) Oral daily  aspirin Suppository 300 milliGRAM(s) Rectal once  cefuroxime  IVPB 1500 milliGRAM(s) IV Intermittent every 8 hours  chlorhexidine 0.12% Liquid 5 milliLiter(s) Swish and Spit every 4 hours  dexmedetomidine Infusion 0.1 MICROgram(s)/kG/Hr (2.735 mL/Hr) IV Continuous <Continuous>  dextrose 50% Injectable 50 milliLiter(s) IV Push every 15 minutes  dextrose 50% Injectable 25 milliLiter(s) IV Push every 15 minutes  docusate sodium 100 milliGRAM(s) Oral three times a day  HYDROmorphone PCA (1 mG/mL) 30 milliLiter(s) PCA Continuous PCA Continuous  insulin Infusion 1 Unit(s)/Hr (1 mL/Hr) IV Continuous <Continuous>  meperidine     Injectable 25 milliGRAM(s) IV Push once  metoclopramide  IVPB 10 milliGRAM(s) IV Intermittent every 8 hours  niCARdipine Infusion 1 mG/Hr (5 mL/Hr) IV Continuous <Continuous>  pantoprazole  Injectable 40 milliGRAM(s) IV Push daily  potassium chloride  10 mEq/50 mL IVPB 10 milliEquivalent(s) IV Intermittent every 1 hour  potassium chloride  10 mEq/50 mL IVPB 10 milliEquivalent(s) IV Intermittent every 1 hour  potassium chloride  10 mEq/50 mL IVPB 10 milliEquivalent(s) IV Intermittent every 1 hour  potassium chloride  10 mEq/50 mL IVPB 10 milliEquivalent(s) IV Intermittent every 1 hour  sodium chloride 0.9%. 1000 milliLiter(s) (10 mL/Hr) IV Continuous <Continuous>    MEDICATIONS  (PRN):  HYDROmorphone PCA (1 mG/mL) Rescue Clinician Bolus 0.5 milliGRAM(s) IV Push every 15 minutes PRN for Pain Scale GREATER THAN 6  naloxone Injectable 0.1 milliGRAM(s) IV Push every 3 minutes PRN For ANY of the following changes in patient status:  A. RR LESS THAN 10 breaths per minute, B. Oxygen saturation LESS THAN 90%, C. Sedation score of 6  ondansetron Injectable 4 milliGRAM(s) IV Push every 6 hours PRN Nausea    General: Alert and orientedx3, in no acute distress  Chest: sternal dressing C/D/I  Heart: S1, S2, regular rate and rhythm  Lungs: clear to auscultation B/L, without wheezes, rhonci, rales  Abdomen: Soft, non distended, non tender, positive bowel sounds  Extremeties: without edema B/L LE, positive DP pulses B/L     Does the patient have a history of CHF: no  -If yes, systolic or diastolic:  -pre-operative EF: 65%    Extubation within 24 hours: yes    Does the patient have a history of kidney disease: yes  -give CKD stage if applicable: stage 2  -what is patient's baseline Creatinine: 1.2    Beta Blockers contraindicated for the first 24 hours due to vasopressor/inotrpic medication: no  -If on pressors, indication:    Did the patient receive transfusion of blood and/or products: no  -If yes, indication:    DVT PPX: scd B/L     Beatrice-operative antibiotics discontinued within 48 hours of CTU admission: yes  -name/date/time of discontinue  -mireille/[id number]/01:30      Patient care discussed on morning interdisciplinary rounds with CTS team. Operation: CABG x2 with B/L internal mammary arteries   POD: 1  Narrative: patient resting comfortably in bed    Vital Signs Last 24 Hrs  T(C): 37.9 (03 Jul 2018 23:00), Max: 37.9 (03 Jul 2018 23:00)  T(F): 100.2 (03 Jul 2018 23:00), Max: 100.2 (03 Jul 2018 23:00)  HR: 79 (04 Jul 2018 01:00) (54 - 85)  BP: 135/61 (04 Jul 2018 01:00) (120/60 - 135/61)  BP(mean): 88 (04 Jul 2018 01:00) (84 - 88)  RR: 15 (04 Jul 2018 01:00) (5 - 25)  SpO2: 100% (04 Jul 2018 01:00) (98% - 100%)  I&O's Detail    03 Jul 2018 07:01  -  04 Jul 2018 01:37  --------------------------------------------------------  IN:    Albumin 5%  - 250 mL: 750 mL    dexmedetomidine Infusion: 16.4 mL    insulin Infusion: 42 mL    IV PiggyBack: 900 mL    Lactated Ringers IV Bolus: 1500 mL    Oral Fluid: 120 mL    sodium chloride 0.9%.: 110 mL  Total IN: 3438.4 mL    OUT:    Chest Tube: 85 mL    Chest Tube: 250 mL    Chest Tube: 10 mL    Indwelling Catheter - Urethral: 2595 mL    Nasoenteral Tube: 140 mL  Total OUT: 3080 mL    Total NET: 358.4 mL    LABS:                        12.9   13.7  )-----------( 162      ( 04 Jul 2018 03:00 )             35.2     07-04    141  |  102  |  13  ----------------------------<  96  4.1   |  21<L>  |  1.03    Ca    9.3      04 Jul 2018 03:00  Phos  2.1     07-04  Mg     1.9     07-04    TPro  6.2  /  Alb  4.3  /  TBili  3.3<H>  /  DBili  x   /  AST  34  /  ALT  28  /  AlkPhos  45  07-04    PT/INR - ( 04 Jul 2018 03:00 )   PT: 13.2 sec;   INR: 1.22 ratio         PTT - ( 04 Jul 2018 03:00 )  PTT:29.6 sec  ABG - ( 03 Jul 2018 23:21 )  pH, Arterial: 7.41  pH, Blood: x     /  pCO2: 40    /  pO2: 163   / HCO3: 25    / Base Excess: .6    /  SaO2: 100       MEDICATIONS  (STANDING):  aspirin enteric coated 325 milliGRAM(s) Oral daily  aspirin Suppository 300 milliGRAM(s) Rectal once  cefuroxime  IVPB 1500 milliGRAM(s) IV Intermittent every 8 hours  chlorhexidine 0.12% Liquid 5 milliLiter(s) Swish and Spit every 4 hours  dexmedetomidine Infusion 0.1 MICROgram(s)/kG/Hr (2.735 mL/Hr) IV Continuous <Continuous>  dextrose 50% Injectable 50 milliLiter(s) IV Push every 15 minutes  dextrose 50% Injectable 25 milliLiter(s) IV Push every 15 minutes  docusate sodium 100 milliGRAM(s) Oral three times a day  HYDROmorphone PCA (1 mG/mL) 30 milliLiter(s) PCA Continuous PCA Continuous  insulin Infusion 1 Unit(s)/Hr (1 mL/Hr) IV Continuous <Continuous>  meperidine     Injectable 25 milliGRAM(s) IV Push once  metoclopramide  IVPB 10 milliGRAM(s) IV Intermittent every 8 hours  niCARdipine Infusion 1 mG/Hr (5 mL/Hr) IV Continuous <Continuous>  pantoprazole  Injectable 40 milliGRAM(s) IV Push daily  potassium chloride  10 mEq/50 mL IVPB 10 milliEquivalent(s) IV Intermittent every 1 hour  potassium chloride  10 mEq/50 mL IVPB 10 milliEquivalent(s) IV Intermittent every 1 hour  potassium chloride  10 mEq/50 mL IVPB 10 milliEquivalent(s) IV Intermittent every 1 hour  potassium chloride  10 mEq/50 mL IVPB 10 milliEquivalent(s) IV Intermittent every 1 hour  sodium chloride 0.9%. 1000 milliLiter(s) (10 mL/Hr) IV Continuous <Continuous>    MEDICATIONS  (PRN):  HYDROmorphone PCA (1 mG/mL) Rescue Clinician Bolus 0.5 milliGRAM(s) IV Push every 15 minutes PRN for Pain Scale GREATER THAN 6  naloxone Injectable 0.1 milliGRAM(s) IV Push every 3 minutes PRN For ANY of the following changes in patient status:  A. RR LESS THAN 10 breaths per minute, B. Oxygen saturation LESS THAN 90%, C. Sedation score of 6  ondansetron Injectable 4 milliGRAM(s) IV Push every 6 hours PRN Nausea    General: Alert and orientedx3, in no acute distress  Chest: sternal dressing C/D/I  Heart: S1, S2, regular rate and rhythm  Lungs: clear to auscultation B/L, without wheezes, rhonci, rales  Abdomen: Soft, non distended, non tender, positive bowel sounds  Extremeties: without edema B/L LE, positive DP pulses B/L     Does the patient have a history of CHF: no  -If yes, systolic or diastolic:  -pre-operative EF: 65%    Extubation within 24 hours: yes    Does the patient have a history of kidney disease: yes  -give CKD stage if applicable: stage 2  -what is patient's baseline Creatinine: 1.2    Beta Blockers contraindicated for the first 24 hours due to vasopressor/inotrpic medication: no  -If on pressors, indication:    Did the patient receive transfusion of blood and/or products: no  -If yes, indication:    DVT PPX: scd B/L     Beatrice-operative antibiotics discontinued within 48 hours of CTU admission: yes  -name/date/time of discontinue  -mireille/7-5-18/01:30      Patient care discussed on morning interdisciplinary rounds with CTS team.

## 2018-07-04 NOTE — PROGRESS NOTE ADULT - PROBLEM SELECTOR PLAN 1
ASA, Statin, c/w BB and uptitrate as tolerated for a fib prophylaxis   DVT ppx, GI ppx   C/w pain control, glucose control   Monitor chest tube output and d/c when output minimal

## 2018-07-04 NOTE — PHYSICAL THERAPY INITIAL EVALUATION ADULT - PERTINENT HX OF CURRENT PROBLEM, REHAB EVAL
Pt is a 57 y.o. male with PMHx of HTN, HLD, GERD c/o slight chest burning and SOB while walking uphill about 1 month ago. s/p cardiac catheterization revealed TVD. Presents to [facility] for a scheduled CABG x 2 on 7/3/2018. CXR 6/28/18: Mild degenerative changes of the thoracic spine. Echo 7/3/18: Mild concentric left ventricular hypertrophy. Minimal tricuspid regurgitation.

## 2018-07-04 NOTE — PROGRESS NOTE ADULT - ASSESSMENT
57 year old male s/p CABGx2 via B/L internal mammary arteries   1. beta blockers as tolerated for a-fib ppx  2. Aspirin, statin for graft patency ppx  3. GI ppx  4. VTE ppx  5. OOB to chair, ambulate as tolerated  6. Blood sugar control  7. Pain management-PCA

## 2018-07-04 NOTE — PHYSICAL THERAPY INITIAL EVALUATION ADULT - GENERAL OBSERVATIONS, REHAB EVAL
Pt received sitting in recliner with +ext pacer, +2 chest tubes, +PCA pump, +2L of O2 via NC, +christianson, +cardiac monitor and +a-line. NAD noted.

## 2018-07-04 NOTE — PROGRESS NOTE ADULT - SUBJECTIVE AND OBJECTIVE BOX
STEVO YARBROUGH   MRN#: [id number]     The patient is a 57y Male who was seen, evaluated, & examined with the CTICU staff on rounds and later in the day with a multidisciplinary care plan formulated & implemented.  All available clinical, laboratory, radiographic, pharmacologic, and electrocardiographic data were reviewed & analyzed.      The patient was in the CTICU in critical condition secondary to acute hypoxic respiratory failure-persistent cardiopulmonary dysfunction, hemodynamically significant hypovolemia-shock, resolved hyperlactatemia-acidosis, & stress hyperglycemia.      Respiratory failure required full ventilatory support, the following of ABG’s with A-line monitoring, continuous pulse oximetry monitoring, and an IV Propofol infusion for support & to evaluate for & prevent further decompensation secondary to persistent cardiopulmonary dysfunction.     Invasive hemodynamic monitoring with an A-line was required for the following of continuous MAP/BP monitoring to ensure adequate cardiovascular support and to evaluate for & help prevent decompensation while receiving intermittent volume expansion and cessation an IV Levophed drip secondary to resolved hemodynamically significant hypovolemia-shock and resolved hyperlactatemia-acidosis.       Metabolic stability, stress hyperglycemia, & infection prophylaxis required an IV regular Insulin drip & the following of serial glucose levels to help achieve & maintain euglycemia.      Patient required critical care management and I provided 80 minutes of non-continuous care to the patient.  Discussed at length with the CTICU staff and helped coordinate care.

## 2018-07-04 NOTE — PROGRESS NOTE ADULT - ASSESSMENT
57y Male PMH CAD, HTN, HLD, GERD now POD 1 C3B  Post op course unremarkable. Started on BB and tolerating. Solis out and voiding w/o difficulty. PCA for pain which is well controlled.

## 2018-07-04 NOTE — CONSULT NOTE ADULT - ASSESSMENT
CHIEF COMPLAINT:Patient is a 57y old  Male who presents with a chief complaint of "CABG x 3" (03 Jul 2018 07:03)      HISTORY OF PRESENT ILLNESS:HPI:  58 y/o male with PMHx of HTN, HLD, GERD c/o slight chest burning and SOB while walking uphill about 1 month ago. s/p cardiac catheterization revealed TVD. Presents to [facility] for a scheduled CABG x 3 on 7/3/2018. (28 Jun 2018 17:13)      PAST MEDICAL & SURGICAL HISTORY:  Atherosclerotic heart disease of native coronary artery without angina pectoris  GERD (gastroesophageal reflux disease)  HLD (hyperlipidemia)  HTN (hypertension)  No significant past surgical history          MEDICATIONS:  aspirin enteric coated 325 milliGRAM(s) Oral daily  enoxaparin Injectable 40 milliGRAM(s) SubCutaneous daily  metoprolol tartrate 25 milliGRAM(s) Oral two times a day    cefuroxime  IVPB 1500 milliGRAM(s) IV Intermittent every 8 hours      HYDROmorphone PCA (1 mG/mL) 30 milliLiter(s) PCA Continuous PCA Continuous  HYDROmorphone PCA (1 mG/mL) Rescue Clinician Bolus 0.5 milliGRAM(s) IV Push every 15 minutes PRN  metoclopramide Injectable 10 milliGRAM(s) IV Push every 8 hours  ondansetron Injectable 4 milliGRAM(s) IV Push every 6 hours PRN    docusate sodium 100 milliGRAM(s) Oral three times a day  pantoprazole    Tablet 40 milliGRAM(s) Oral before breakfast  polyethylene glycol 3350 17 Gram(s) Oral daily    atorvastatin 40 milliGRAM(s) Oral at bedtime  dextrose 50% Injectable 50 milliLiter(s) IV Push every 15 minutes  dextrose 50% Injectable 25 milliLiter(s) IV Push every 15 minutes  insulin lispro (HumaLOG) corrective regimen sliding scale   SubCutaneous three times a day before meals    sodium chloride 0.9%. 1000 milliLiter(s) IV Continuous <Continuous>      FAMILY HISTORY:      Non-contributory    SOCIAL HISTORY:    [ ] Tobacco  [ ] Drugs  [ ] Alcohol    Allergies    No Known Allergies    Intolerances    	    REVIEW OF SYSTEMS:  CONSTITUTIONAL: No fever  EYES: No eye pain, visual disturbances, or discharge  ENMT:  No difficulty hearing, tinnitus  NECK: No pain or stiffness  RESPIRATORY: No cough, wheezing,  CARDIOVASCULAR: No chest pain, palpitations, passing out, dizziness, or leg swelling  GASTROINTESTINAL:  No nausea, vomiting, diarrhea or constipation. No melena.  GENITOURINARY: No dysuria, hematuria  NEUROLOGICAL: No stroke like symptoms  SKIN: No burning or lesions   LYMPH Nodes: No enlarged glands  ENDOCRINE: No heat or cold intolerance  MUSCULOSKELETAL: No joint pain or swelling  PSYCHIATRIC: No  anxiety, mood swings  HEME/LYMPH: No bleeding gums  ALLERY AND IMMUNOLOGIC: No hives or eczema	    All other ROS negative    PHYSICAL EXAM:  T(C): 36.4 (07-04-18 @ 23:00), Max: 37.9 (07-04-18 @ 03:00)  HR: 68 (07-04-18 @ 23:00) (57 - 88)  BP: 119/57 (07-04-18 @ 18:33) (90/50 - 135/61)  RR: 18 (07-04-18 @ 23:00) (7 - 28)  SpO2: 96% (07-04-18 @ 23:00) (96% - 100%)  Wt(kg): --  I&O's Summary    03 Jul 2018 07:01  -  04 Jul 2018 07:00  --------------------------------------------------------  IN: 3510.9 mL / OUT: 3905 mL / NET: -394.1 mL    04 Jul 2018 07:01  -  04 Jul 2018 23:12  --------------------------------------------------------  IN: 231 mL / OUT: 760 mL / NET: -529 mL        Appearance: Normal	  HEENT:   Normal oral mucosa, EOMI	  Lymphatic: No lymphadenopathy  Cardiovascular: Normal S1 S2, No JVD, No murmurs, No edema  Respiratory: Lungs clear to auscultation	  Psychiatry: Alert, Mood & affect appropriate  Gastrointestinal:  Soft, Non-tender, + BS	  Skin: No rashes, No ecchymoses, No cyanosis	  Neurologic: Non-focal  Extremities:  No clubbing, cyanosis or edema  Vascular: Peripheral pulses palpable  bilaterally  	    	  	  CARDIAC MARKERS:                                  12.9   13.7  )-----------( 162      ( 04 Jul 2018 03:00 )             35.2     07-04    141  |  102  |  13  ----------------------------<  96  4.1   |  21<L>  |  1.03    Ca    9.3      04 Jul 2018 03:00  Phos  2.1     07-04  Mg     1.9     07-04    TPro  6.2  /  Alb  4.3  /  TBili  3.3<H>  /  DBili  x   /  AST  34  /  ALT  28  /  AlkPhos  45  07-04    HgA1c:   TSH: Thyroid Stimulating Hormone, Serum: 1.02 uIU/mL (07-04 @ 05:25)        EKG:  Radiology:      Assessment /Plan:   pt seen, recs to follow      Ovidio Vega DO Located within [facility]  [facility]  [phone]

## 2018-07-04 NOTE — PROGRESS NOTE ADULT - SUBJECTIVE AND OBJECTIVE BOX
Day _1__ of Anesthesia Pain Management Service    SUBJECTIVE:    Pain Scale Score	At rest: ___ 	With Activity: ___ 	[x ] Refer to charted pain scores    THERAPY:    [ ] IV PCA Morphine		[ ] 5 mg/mL	[ ] 1 mg/mL  [x ] IV PCA Hydromorphone	[ ] 5 mg/mL	[x ] 1 mg/mL  [ ] IV PCA Fentanyl		[ ] 50 micrograms/mL    Demand dose 0.2    lockout 6   (minutes) Continuous Rate 0      MEDICATIONS  (STANDING):  aspirin enteric coated 325 milliGRAM(s) Oral daily  atorvastatin 40 milliGRAM(s) Oral at bedtime  cefuroxime  IVPB 1500 milliGRAM(s) IV Intermittent every 8 hours  dextrose 50% Injectable 50 milliLiter(s) IV Push every 15 minutes  dextrose 50% Injectable 25 milliLiter(s) IV Push every 15 minutes  docusate sodium 100 milliGRAM(s) Oral three times a day  enoxaparin Injectable 40 milliGRAM(s) SubCutaneous daily  HYDROmorphone PCA (1 mG/mL) 30 milliLiter(s) PCA Continuous PCA Continuous  insulin Infusion 1 Unit(s)/Hr (1 mL/Hr) IV Continuous <Continuous>  insulin lispro (HumaLOG) corrective regimen sliding scale   SubCutaneous three times a day before meals  metoclopramide  IVPB 10 milliGRAM(s) IV Intermittent every 8 hours  metoprolol tartrate 25 milliGRAM(s) Oral two times a day  niCARdipine Infusion 1 mG/Hr (5 mL/Hr) IV Continuous <Continuous>  pantoprazole    Tablet 40 milliGRAM(s) Oral before breakfast  polyethylene glycol 3350 17 Gram(s) Oral daily  sodium chloride 0.9%. 1000 milliLiter(s) (10 mL/Hr) IV Continuous <Continuous>    MEDICATIONS  (PRN):  HYDROmorphone PCA (1 mG/mL) Rescue Clinician Bolus 0.5 milliGRAM(s) IV Push every 15 minutes PRN for Pain Scale GREATER THAN 6  naloxone Injectable 0.1 milliGRAM(s) IV Push every 3 minutes PRN For ANY of the following changes in patient status:  A. RR LESS THAN 10 breaths per minute, B. Oxygen saturation LESS THAN 90%, C. Sedation score of 6  ondansetron Injectable 4 milliGRAM(s) IV Push every 6 hours PRN Nausea      OBJECTIVE:    Sedation Score:	[x ] Alert	[ ] Drowsy 	[ ] Arousable	[ ] Asleep	[ ] Unresponsive    Side Effects:	[x ] None	[ ] Nausea	[ ] Vomiting	[ ] Pruritus  		[ ] Other:    Vital Signs Last 24 Hrs  T(C): 37.5 (04 Jul 2018 07:00), Max: 37.9 (03 Jul 2018 23:00)  T(F): 99.5 (04 Jul 2018 07:00), Max: 100.2 (03 Jul 2018 23:00)  HR: 59 (04 Jul 2018 10:00) (59 - 85)  BP: 96/53 (04 Jul 2018 10:00) (96/53 - 135/61)  BP(mean): 71 (04 Jul 2018 10:00) (71 - 91)  RR: 8 (04 Jul 2018 10:00) (5 - 28)  SpO2: 98% (04 Jul 2018 10:00) (98% - 100%)    ASSESSMENT/ PLAN    Therapy to  be:	[x ] Continue   [ ] Discontinued   [ ] Change to prn Analgesics    Documentation and Verification of current medications:   [X] Done	[ ] Not done, not eligible    Comments:

## 2018-07-04 NOTE — CONSULT NOTE ADULT - SUBJECTIVE AND OBJECTIVE BOX
CHIEF COMPLAINT:Patient is a 57y old  Male who presents with a chief complaint of "CABG x 3" (03 Jul 2018 07:03)      HISTORY OF PRESENT ILLNESS:HPI:  58 y/o male with PMHx of HTN, HLD, GERD c/o slight chest burning and SOB while walking uphill about 1 month ago. s/p cardiac catheterization revealed TVD. Presents to [facility] for a scheduled CABG x 3 on 7/3/2018.  Now s/p CABG with 2 mammary arteries      PAST MEDICAL & SURGICAL HISTORY:  Atherosclerotic heart disease of native coronary artery without angina pectoris  GERD (gastroesophageal reflux disease)  HLD (hyperlipidemia)  HTN (hypertension)  No significant past surgical history          MEDICATIONS:  enoxaparin Injectable 40 milliGRAM(s) SubCutaneous daily        HYDROmorphone   Tablet 2 milliGRAM(s) Oral every 3 hours PRN  HYDROmorphone   Tablet 4 milliGRAM(s) Oral every 3 hours PRN  metoclopramide Injectable 10 milliGRAM(s) IV Push every 8 hours    pantoprazole    Tablet 40 milliGRAM(s) Oral before breakfast  polyethylene glycol 3350 17 Gram(s) Oral daily    atorvastatin 40 milliGRAM(s) Oral at bedtime        FAMILY HISTORY:      Non-contributory    SOCIAL HISTORY:    not an active smoker  Allergies    No Known Allergies    Intolerances    	    REVIEW OF SYSTEMS:  CONSTITUTIONAL: No fever  EYES: No eye pain, visual disturbances, or discharge  ENMT:  No difficulty hearing, tinnitus  NECK: No pain or stiffness  RESPIRATORY: No cough, wheezing,  CARDIOVASCULAR: + chest incisional pain, no palpitations, passing out, dizziness, or leg swelling  GASTROINTESTINAL:  No nausea, vomiting, diarrhea or constipation. No melena.  GENITOURINARY: No dysuria, hematuria  NEUROLOGICAL: No stroke like symptoms  SKIN: No burning or lesions   LYMPH Nodes: No enlarged glands  ENDOCRINE: No heat or cold intolerance  MUSCULOSKELETAL: No joint pain or swelling  PSYCHIATRIC: No  anxiety, mood swings  HEME/LYMPH: No bleeding gums  ALLERY AND IMMUNOLOGIC: No hives or eczema	    All other ROS negative    PHYSICAL EXAM:  T(C): 37 (07-05-18 @ 20:01), Max: 37 (07-05-18 @ 20:01)  HR: 72 (07-05-18 @ 20:01) (62 - 72)  BP: 116/68 (07-05-18 @ 20:01) (116/68 - 145/73)  RR: 18 (07-05-18 @ 20:01) (17 - 19)  SpO2: 95% (07-05-18 @ 20:01) (94% - 97%)  Wt(kg): --  I&O's Summary    04 Jul 2018 07:01  -  05 Jul 2018 07:00  --------------------------------------------------------  IN: 231 mL / OUT: 1848 mL / NET: -1617 mL    05 Jul 2018 07:01  -  05 Jul 2018 21:57  --------------------------------------------------------  IN: 390 mL / OUT: 835 mL / NET: -445 mL        Appearance: Normal	  HEENT:   Normal oral mucosa, EOMI	  Lymphatic: No lymphadenopathy  Cardiovascular: Normal S1 S2, No JVD, No murmurs, No edema  Respiratory: Lungs clear to auscultation	  Psychiatry: Alert, Mood & affect appropriate  Gastrointestinal:  Soft, Non-tender, + BS	  Skin: No rashes, No ecchymoses, No cyanosis	  Neurologic: Non-focal  Extremities:  No clubbing, cyanosis or edema  Vascular: Peripheral pulses palpable  bilaterally  	    	  	  CARDIAC MARKERS:                          12.2   20.6  )-----------( 159      ( 05 Jul 2018 03:59 )             34.1     07-05    137  |  99  |  22  ----------------------------<  154<H>  4.9   |  24  |  1.11    Ca    9.0      05 Jul 2018 03:59  Phos  2.1     07-04  Mg     1.9     07-04    TPro  6.2  /  Alb  4.3  /  TBili  3.3<H>  /  DBili  x   /  AST  34  /  ALT  28  /  AlkPhos  45  07-04    HgA1c:   TSH:       EKG: nsr with <1mm ST elevations, ?repol  Radiology: sternotomy and surgical wires      Problem/Plan - 1:  ·  Problem: Atherosclerotic heart disease of native coronary artery s/p CABG x 3.  Plan:Doing well off pressor support  Metoprolol as BP tolerates  Statin  Resume ASA    Problem/Plan - 2:  ·  Problem: HTN (hypertension).  Plan: On Metoprolol, will uptitrate as BP tolerates  - off home CCB    Problem/Plan - 3:  HLD - c/w statin      Problem/Plan - 4:  ·  Problem: Need for prophylactic measure.  Plan: On Lovenox       Ovidio Vega DO [facility]  [facility]  [phone]  a

## 2018-07-05 DIAGNOSIS — R52 PAIN, UNSPECIFIED: ICD-10-CM

## 2018-07-05 LAB
ANION GAP SERPL CALC-SCNC: 14 MMOL/L — SIGNIFICANT CHANGE UP (ref 5–17)
BUN SERPL-MCNC: 22 MG/DL — SIGNIFICANT CHANGE UP (ref 7–23)
CALCIUM SERPL-MCNC: 9 MG/DL — SIGNIFICANT CHANGE UP (ref 8.4–10.5)
CHLORIDE SERPL-SCNC: 99 MMOL/L — SIGNIFICANT CHANGE UP (ref 96–108)
CO2 SERPL-SCNC: 24 MMOL/L — SIGNIFICANT CHANGE UP (ref 22–31)
CREAT SERPL-MCNC: 1.11 MG/DL — SIGNIFICANT CHANGE UP (ref 0.5–1.3)
GLUCOSE BLDC GLUCOMTR-MCNC: 107 MG/DL — HIGH (ref 70–99)
GLUCOSE BLDC GLUCOMTR-MCNC: 119 MG/DL — HIGH (ref 70–99)
GLUCOSE BLDC GLUCOMTR-MCNC: 126 MG/DL — HIGH (ref 70–99)
GLUCOSE BLDC GLUCOMTR-MCNC: 154 MG/DL — HIGH (ref 70–99)
GLUCOSE SERPL-MCNC: 154 MG/DL — HIGH (ref 70–99)
HCT VFR BLD CALC: 34.1 % — LOW (ref 39–50)
HGB BLD-MCNC: 12.2 G/DL — LOW (ref 13–17)
MCHC RBC-ENTMCNC: 32.4 PG — SIGNIFICANT CHANGE UP (ref 27–34)
MCHC RBC-ENTMCNC: 35.8 GM/DL — SIGNIFICANT CHANGE UP (ref 32–36)
MCV RBC AUTO: 90.5 FL — SIGNIFICANT CHANGE UP (ref 80–100)
PLATELET # BLD AUTO: 159 K/UL — SIGNIFICANT CHANGE UP (ref 150–400)
POTASSIUM SERPL-MCNC: 4.9 MMOL/L — SIGNIFICANT CHANGE UP (ref 3.5–5.3)
POTASSIUM SERPL-SCNC: 4.9 MMOL/L — SIGNIFICANT CHANGE UP (ref 3.5–5.3)
RBC # BLD: 3.77 M/UL — LOW (ref 4.2–5.8)
RBC # FLD: 11.7 % — SIGNIFICANT CHANGE UP (ref 10.3–14.5)
SODIUM SERPL-SCNC: 137 MMOL/L — SIGNIFICANT CHANGE UP (ref 135–145)
WBC # BLD: 20.6 K/UL — HIGH (ref 3.8–10.5)
WBC # FLD AUTO: 20.6 K/UL — HIGH (ref 3.8–10.5)

## 2018-07-05 PROCEDURE — 71045 X-RAY EXAM CHEST 1 VIEW: CPT | Mod: 26,76

## 2018-07-05 PROCEDURE — 71045 X-RAY EXAM CHEST 1 VIEW: CPT | Mod: 26,77

## 2018-07-05 RX ORDER — HYDROMORPHONE HYDROCHLORIDE 2 MG/ML
4 INJECTION INTRAMUSCULAR; INTRAVENOUS; SUBCUTANEOUS
Qty: 0 | Refills: 0 | Status: DISCONTINUED | OUTPATIENT
Start: 2018-07-05 | End: 2018-07-10

## 2018-07-05 RX ORDER — METOPROLOL TARTRATE 50 MG
50 TABLET ORAL DAILY
Qty: 0 | Refills: 0 | Status: DISCONTINUED | OUTPATIENT
Start: 2018-07-06 | End: 2018-07-09

## 2018-07-05 RX ORDER — HYDROMORPHONE HYDROCHLORIDE 2 MG/ML
2 INJECTION INTRAMUSCULAR; INTRAVENOUS; SUBCUTANEOUS
Qty: 0 | Refills: 0 | Status: DISCONTINUED | OUTPATIENT
Start: 2018-07-05 | End: 2018-07-10

## 2018-07-05 RX ADMIN — Medication 2: at 12:35

## 2018-07-05 RX ADMIN — Medication 10 MILLIGRAM(S): at 13:01

## 2018-07-05 RX ADMIN — Medication 100 MILLIGRAM(S): at 06:18

## 2018-07-05 RX ADMIN — Medication 100 MILLIGRAM(S): at 13:01

## 2018-07-05 RX ADMIN — Medication 5: at 08:08

## 2018-07-05 RX ADMIN — HYDROMORPHONE HYDROCHLORIDE 4 MILLIGRAM(S): 2 INJECTION INTRAMUSCULAR; INTRAVENOUS; SUBCUTANEOUS at 22:12

## 2018-07-05 RX ADMIN — HYDROMORPHONE HYDROCHLORIDE 30 MILLILITER(S): 2 INJECTION INTRAMUSCULAR; INTRAVENOUS; SUBCUTANEOUS at 07:05

## 2018-07-05 RX ADMIN — Medication 100 MILLIGRAM(S): at 00:57

## 2018-07-05 RX ADMIN — Medication 10 MILLIGRAM(S): at 06:18

## 2018-07-05 RX ADMIN — POLYETHYLENE GLYCOL 3350 17 GRAM(S): 17 POWDER, FOR SOLUTION ORAL at 12:59

## 2018-07-05 RX ADMIN — HYDROMORPHONE HYDROCHLORIDE 4 MILLIGRAM(S): 2 INJECTION INTRAMUSCULAR; INTRAVENOUS; SUBCUTANEOUS at 22:45

## 2018-07-05 RX ADMIN — Medication 25 MILLIGRAM(S): at 06:18

## 2018-07-05 RX ADMIN — ATORVASTATIN CALCIUM 40 MILLIGRAM(S): 80 TABLET, FILM COATED ORAL at 22:23

## 2018-07-05 RX ADMIN — PANTOPRAZOLE SODIUM 40 MILLIGRAM(S): 20 TABLET, DELAYED RELEASE ORAL at 06:18

## 2018-07-05 RX ADMIN — Medication 325 MILLIGRAM(S): at 12:59

## 2018-07-05 RX ADMIN — ENOXAPARIN SODIUM 40 MILLIGRAM(S): 100 INJECTION SUBCUTANEOUS at 12:59

## 2018-07-05 RX ADMIN — Medication 25 MILLIGRAM(S): at 17:23

## 2018-07-05 RX ADMIN — Medication 10 MILLIGRAM(S): at 22:23

## 2018-07-05 NOTE — PROGRESS NOTE ADULT - SUBJECTIVE AND OBJECTIVE BOX
Pain Management Attending Addendum    SUBJECTIVE:    Therapy:	  [X ] IV PCA	   [ ] Epidural           [ ] s/p Spinal Opoid              [ ] Postpartum infusion	  [ ] Patient controlled regional anesthesia (PCRA)    [ ] prn Analgesics    OBJECTIVE:   [X ] No new signs     [ ] Other:    Side Effects:  [X ] None			[ ] Other:    Assessment of Catheter Site:		[ ] Intact		[ ] Other:n/a    ASSESSMENT/PLAN  [X ] Continue current therapy    [ ] Therapy changed to:    [ ] IV PCA       [ ] Epidural     [ ] prn Analgesics     [ ] post partum infusion    Comments:

## 2018-07-05 NOTE — PROGRESS NOTE ADULT - ASSESSMENT
57y Male PMH CAD, HTN, HLD, GERD now POD 1 C3B  Post op course unremarkable. Started on BB and tolerating. Solis out and voiding w/o difficulty. PCA for pain which is well controlled.   7/5 VSS - multi-disciplinary rounds with Dr. Sims - pt. can transfer to floor 57M- PMH CAD, HTN, HLD, GERD now POD 1 C3B  Post op course unremarkable. Started on BB and tolerating. Solis out and voiding w/o difficulty. PCA for pain which is well controlled.   7/5 VSS - multi-disciplinary rounds with Dr. Sims - left CT removed CXR ok- Right CT removed - CXR ok  d/c planning

## 2018-07-05 NOTE — PROGRESS NOTE ADULT - SUBJECTIVE AND OBJECTIVE BOX
Patient is a 57y old  Male who presents with a chief complaint of "CABG x 3" (03 Jul 2018 07:03)      INTERVAL HISTORY: POD 2        PHYSICAL EXAM:  T(C): 37 (07-05-18 @ 20:01), Max: 37 (07-05-18 @ 20:01)  HR: 72 (07-05-18 @ 20:01) (62 - 72)  BP: 116/68 (07-05-18 @ 20:01) (116/68 - 145/73)  RR: 18 (07-05-18 @ 20:01) (17 - 19)  SpO2: 95% (07-05-18 @ 20:01) (94% - 97%)  Wt(kg): --  I&O's Summary    04 Jul 2018 07:01  -  05 Jul 2018 07:00  --------------------------------------------------------  IN: 231 mL / OUT: 1848 mL / NET: -1617 mL    05 Jul 2018 07:01  -  05 Jul 2018 22:07  --------------------------------------------------------  IN: 390 mL / OUT: 835 mL / NET: -445 mL          Appearance: Normal	  HEENT:    PERRL, EOMI	  Cardiovascular:  S1 S2, No JVD  Respiratory: Lungs clear to auscultation	  Psychiatry: Alert  Gastrointestinal:  Soft, Non-tender, + BS	  Skin: No rashes, No cyanosis  Extremities:  No edema of LE                                12.2   20.6  )-----------( 159      ( 05 Jul 2018 03:59 )             34.1     07-05    137  |  99  |  22  ----------------------------<  154<H>  4.9   |  24  |  1.11    Ca    9.0      05 Jul 2018 03:59  Phos  2.1     07-04  Mg     1.9     07-04    TPro  6.2  /  Alb  4.3  /  TBili  3.3<H>  /  DBili  x   /  AST  34  /  ALT  28  /  AlkPhos  45  07-04        Labs personally reviewed      ASSESSMENT/PLAN: 	          Ovidio Vega DO [facility]  Cardiovascular Medicine  [phone] Patient is a 57y old  Male who presents with a chief complaint of "CABG x 3" (03 Jul 2018 07:03)      INTERVAL HISTORY: POD 2        PHYSICAL EXAM:  T(C): 37 (07-05-18 @ 20:01), Max: 37 (07-05-18 @ 20:01)  HR: 72 (07-05-18 @ 20:01) (62 - 72)  BP: 116/68 (07-05-18 @ 20:01) (116/68 - 145/73)  RR: 18 (07-05-18 @ 20:01) (17 - 19)  SpO2: 95% (07-05-18 @ 20:01) (94% - 97%)  Wt(kg): --  I&O's Summary    04 Jul 2018 07:01  -  05 Jul 2018 07:00  --------------------------------------------------------  IN: 231 mL / OUT: 1848 mL / NET: -1617 mL    05 Jul 2018 07:01  -  05 Jul 2018 22:07  --------------------------------------------------------  IN: 390 mL / OUT: 835 mL / NET: -445 mL          Appearance: Normal	  HEENT:    PERRL, EOMI	  Cardiovascular:  S1 S2, No JVD  Respiratory: Lungs clear to auscultation	  Psychiatry: Alert  Gastrointestinal:  Soft, Non-tender, + BS	  Skin: No rashes, No cyanosis  Extremities:  No edema of LE                                12.2   20.6  )-----------( 159      ( 05 Jul 2018 03:59 )             34.1     07-05    137  |  99  |  22  ----------------------------<  154<H>  4.9   |  24  |  1.11    Ca    9.0      05 Jul 2018 03:59  Phos  2.1     07-04  Mg     1.9     07-04    TPro  6.2  /  Alb  4.3  /  TBili  3.3<H>  /  DBili  x   /  AST  34  /  ALT  28  /  AlkPhos  45  07-04        Labs personally reviewed      Problem/Plan - 1:  ·  Problem: Atherosclerotic heart disease of native coronary artery s/p CABG x 3.  Plan:Doing well off pressor support  Metoprolol as BP tolerates  Statin  Resume ASA    Problem/Plan - 2:  ·  Problem: HTN (hypertension).  Plan: On Metoprolol, will uptitrate as BP tolerates  - off home CCB    Problem/Plan - 3:  HLD - c/w statin      Problem/Plan - 4:  ·  Problem: Need for prophylactic measure.  Plan: On Lovenox         Ovidio Vega DO [facility]  Cardiovascular Medicine  [phone] Patient is a 57y old  Male who presents with a chief complaint of "CABG x 2" (03 Jul 2018 07:03)      INTERVAL HISTORY: POD 2        PHYSICAL EXAM:  T(C): 37 (07-05-18 @ 20:01), Max: 37 (07-05-18 @ 20:01)  HR: 72 (07-05-18 @ 20:01) (62 - 72)  BP: 116/68 (07-05-18 @ 20:01) (116/68 - 145/73)  RR: 18 (07-05-18 @ 20:01) (17 - 19)  SpO2: 95% (07-05-18 @ 20:01) (94% - 97%)  Wt(kg): --  I&O's Summary    04 Jul 2018 07:01  -  05 Jul 2018 07:00  --------------------------------------------------------  IN: 231 mL / OUT: 1848 mL / NET: -1617 mL    05 Jul 2018 07:01  -  05 Jul 2018 22:07  --------------------------------------------------------  IN: 390 mL / OUT: 835 mL / NET: -445 mL          Appearance: Normal	  HEENT:    PERRL, EOMI	  Cardiovascular:  S1 S2, No JVD  Respiratory: Lungs clear to auscultation	  Psychiatry: Alert  Gastrointestinal:  Soft, Non-tender, + BS	  Skin: No rashes, No cyanosis  Extremities:  No edema of LE                                12.2   20.6  )-----------( 159      ( 05 Jul 2018 03:59 )             34.1     07-05    137  |  99  |  22  ----------------------------<  154<H>  4.9   |  24  |  1.11    Ca    9.0      05 Jul 2018 03:59  Phos  2.1     07-04  Mg     1.9     07-04    TPro  6.2  /  Alb  4.3  /  TBili  3.3<H>  /  DBili  x   /  AST  34  /  ALT  28  /  AlkPhos  45  07-04        Labs personally reviewed      Problem/Plan - 1:  ·  Problem: Atherosclerotic heart disease of native coronary artery s/p CABG x 2.  Plan:Doing well off pressor support  Metoprolol as BP tolerates  Statin  Resume ASA    Problem/Plan - 2:  ·  Problem: HTN (hypertension).  Plan: On Metoprolol, will uptitrate as BP tolerates  - off home CCB    Problem/Plan - 3:  HLD - c/w statin      Problem/Plan - 4:  ·  Problem: Need for prophylactic measure.  Plan: On Lovenox         Ovidio Vega DO [facility]  Cardiovascular Medicine  [phone]

## 2018-07-05 NOTE — PROGRESS NOTE ADULT - SUBJECTIVE AND OBJECTIVE BOX
VITAL SIGNS-Tele:  SR 60-70  Vital Signs Last 24 Hrs  T(C): 36.8 (18 @ 07:00), Max: 36.8 (18 @ 11:00)  HR: 70 (18 @ 07:00) (57 - 88)  BP: 130/57 (18 @ 07:00) (90/50 - 130/59)  SpO2: 96% (18 @ 07:00) (94% - 100%)          @ 07:  -   @ 07:00  --------------------------------------------------------  IN: 231 mL / OUT: 1848 mL / NET: -1617 mL     @ 07:01  -   @ 10:58  --------------------------------------------------------  IN: 0 mL / OUT: 60 mL / NET: -60 mL    Daily     Daily Weight in k.5 (2018 07:00)    CAPILLARY BLOOD GLUCOSE  150 (2018 16:10)  132 (2018 15:00)  106 (2018 14:30)  96 (2018 14:00)    Pacing Wires        [  ]   Settings:                                  Isolated  [ x ]        PHYSICAL EXAM:  Neurology: alert and oriented x 3, nonfocal, no gross deficits  CV : S1S2  Sternal Wound :  CDI , Stable  Lungs: CTA  Abdomen: soft, nontender, nondistended, positive bowel sounds, last bowel movement       pre-op  Extremities:   no edema, no calf tenderness      aspirin enteric coated 325 milliGRAM(s) Oral daily  atorvastatin 40 milliGRAM(s) Oral at bedtim  docusate sodium 100 milliGRAM(s) Oral three times a day  enoxaparin Injectable 40 milliGRAM(s) SubCutaneous daily  HYDROmorphone PCA (1 mG/mL) 30 milliLiter(s) PCA Continuous PCA Continuous  HYDROmorphone PCA (1 mG/mL) Rescue Clinician Bolus 0.5 milliGRAM(s) IV Push every 15 minutes PRN  insulin lispro (HumaLOG) corrective regimen sliding scale   SubCutaneous three times a day before meals  metoclopramide Injectable 10 milliGRAM(s) IV Push every 8 hours  metoprolol tartrate 25 milliGRAM(s) Oral two times a day  naloxone Injectable 0.1 milliGRAM(s) IV Push every 3 minutes PRN  ondansetron Injectable 4 milliGRAM(s) IV Push every 6 hours PRN  pantoprazole    Tablet 40 milliGRAM(s) Oral before breakfast  polyethylene glycol 3350 17 Gram(s) Oral daily  sodium chloride 0.9%. 1000 milliLiter(s) IV Continuous <Continuous>    Physical Therapy Rec:   Home  [  ]   Home w/ PT  [  ]  Rehab  [  ]  Discussed with Cardiothoracic Team at AM rounds. VITAL SIGNS-Tele:  SR 60-70  Vital Signs Last 24 Hrs  T(C): 36.8 (18 @ 07:00), Max: 36.8 (18 @ 11:00)  HR: 70 (18 @ 07:00) (57 - 88)  BP: 130/57 (18 @ 07:00) (90/50 - 130/59)  SpO2: 96% (18 @ 07:00) (94% - 100%)          @ 07:  -   @ 07:00  --------------------------------------------------------  IN: 231 mL / OUT: 1848 mL / NET: -1617 mL     @ 07:01  -   @ 10:58  --------------------------------------------------------  IN: 0 mL / OUT: 60 mL / NET: -60 mL    Daily     Daily Weight in k.5 (2018 07:00)    CAPILLARY BLOOD GLUCOSE  150 (2018 16:10)  132 (2018 15:00)  106 (2018 14:30)  96 (2018 14:00)      Pacing Wires        [  ]   Settings:                                  Isolated  [ x ]        PHYSICAL EXAM:  Neurology: alert and oriented x 3, nonfocal, no gross deficits  CV : S1S2  Sternal Wound :  CDI , Stable  Lungs: CTA  Abdomen: soft, nontender, nondistended, positive bowel sounds, last bowel movement       pre-op  Extremities:   no edema, no calf tenderness      aspirin enteric coated 325 milliGRAM(s) Oral daily  atorvastatin 40 milliGRAM(s) Oral at bedtim  docusate sodium 100 milliGRAM(s) Oral three times a day  enoxaparin Injectable 40 milliGRAM(s) SubCutaneous daily  HYDROmorphone PCA (1 mG/mL) 30 milliLiter(s) PCA Continuous PCA Continuous  HYDROmorphone PCA (1 mG/mL) Rescue Clinician Bolus 0.5 milliGRAM(s) IV Push every 15 minutes PRN  insulin lispro (HumaLOG) corrective regimen sliding scale   SubCutaneous three times a day before meals  metoclopramide Injectable 10 milliGRAM(s) IV Push every 8 hours  metoprolol tartrate 25 milliGRAM(s) Oral two times a day  naloxone Injectable 0.1 milliGRAM(s) IV Push every 3 minutes PRN  ondansetron Injectable 4 milliGRAM(s) IV Push every 6 hours PRN  pantoprazole    Tablet 40 milliGRAM(s) Oral before breakfast  polyethylene glycol 3350 17 Gram(s) Oral daily  sodium chloride 0.9%. 1000 milliLiter(s) IV Continuous <Continuous>    Physical Therapy Rec:   Home  [  ]   Home w/ PT  [  ]  Rehab  [  ]  Discussed with Cardiothoracic Team at AM rounds.

## 2018-07-05 NOTE — PROGRESS NOTE ADULT - SUBJECTIVE AND OBJECTIVE BOX
Day 2 of Anesthesia Pain Management Service    SUBJECTIVE: Patient is doing well with IV PCA    Pain Scale Score:	[X] Refer to charted pain scores    THERAPY:    [ ] IV PCA Morphine		[ ] 5 mg/mL	[ ] 1 mg/mL  [X] IV PCA Hydromorphone	[ ] 5 mg/mL	[X] 1 mg/mL  [ ] IV PCA Fentanyl		[ ] 50 micrograms/mL    Demand dose: 0.2 mg     Lockout: 6 minutes   Continuous Rate: 0 mg/hr  4 Hour Limit: 4 mg    MEDICATIONS  (STANDING):  aspirin enteric coated 325 milliGRAM(s) Oral daily  atorvastatin 40 milliGRAM(s) Oral at bedtime  dextrose 50% Injectable 50 milliLiter(s) IV Push every 15 minutes  dextrose 50% Injectable 25 milliLiter(s) IV Push every 15 minutes  docusate sodium 100 milliGRAM(s) Oral three times a day  enoxaparin Injectable 40 milliGRAM(s) SubCutaneous daily  HYDROmorphone PCA (1 mG/mL) 30 milliLiter(s) PCA Continuous PCA Continuous  insulin lispro (HumaLOG) corrective regimen sliding scale   SubCutaneous three times a day before meals  metoclopramide Injectable 10 milliGRAM(s) IV Push every 8 hours  metoprolol tartrate 25 milliGRAM(s) Oral two times a day  pantoprazole    Tablet 40 milliGRAM(s) Oral before breakfast  polyethylene glycol 3350 17 Gram(s) Oral daily  sodium chloride 0.9%. 1000 milliLiter(s) (10 mL/Hr) IV Continuous <Continuous>    MEDICATIONS  (PRN):  HYDROmorphone PCA (1 mG/mL) Rescue Clinician Bolus 0.5 milliGRAM(s) IV Push every 15 minutes PRN for Pain Scale GREATER THAN 6  naloxone Injectable 0.1 milliGRAM(s) IV Push every 3 minutes PRN For ANY of the following changes in patient status:  A. RR LESS THAN 10 breaths per minute, B. Oxygen saturation LESS THAN 90%, C. Sedation score of 6  ondansetron Injectable 4 milliGRAM(s) IV Push every 6 hours PRN Nausea      OBJECTIVE:    Sedation Score:	[ X] Alert	[ ] Drowsy 	[ ] Arousable	[ ] Asleep	[ ] Unresponsive    Side Effects:	[X ] None	[ ] Nausea	[ ] Vomiting	[ ] Pruritus  		[ ] Other:    Vital Signs Last 24 Hrs  T(C): 36.8 (05 Jul 2018 07:00), Max: 36.8 (04 Jul 2018 11:00)  T(F): 98.2 (05 Jul 2018 07:00), Max: 98.3 (04 Jul 2018 23:12)  HR: 70 (05 Jul 2018 07:00) (57 - 88)  BP: 130/57 (05 Jul 2018 07:00) (90/50 - 130/59)  BP(mean): 84 (05 Jul 2018 03:19) (65 - 86)  RR: 19 (05 Jul 2018 07:00) (8 - 23)  SpO2: 96% (05 Jul 2018 07:00) (94% - 100%)    ASSESSMENT/ PLAN    Therapy to  be:               [X] Continued   [ ] Discontinued   [ ] Changed to PRN Analgesics    Documentation and Verification of current medications:   [X] Done	[ ] Not done, not eligible    Comments: OOB in chair. Consider change to PRN analgesics

## 2018-07-05 NOTE — PROGRESS NOTE ADULT - PROBLEM SELECTOR PLAN 1
ASA, Statin, c/w BB and uptitrate as tolerated for a fib prophylaxis   DVT ppx, GI ppx   C/w pain control, glucose control   Monitor chest tube output and d/c when output minimal ASA, Statin, c/w BB and uptitrate as tolerated for a fib prophylaxis   DVT ppx, GI ppx   C/w pain control, glucose control   Left Ct removed - CXR ok  Right CT removed- CXR ok  Monitor chest tube output and d/c when output minimal

## 2018-07-06 ENCOUNTER — TRANSCRIPTION ENCOUNTER (OUTPATIENT)
Age: 58
End: 2018-07-06

## 2018-07-06 LAB
ANION GAP SERPL CALC-SCNC: 14 MMOL/L — SIGNIFICANT CHANGE UP (ref 5–17)
BUN SERPL-MCNC: 22 MG/DL — SIGNIFICANT CHANGE UP (ref 7–23)
CALCIUM SERPL-MCNC: 9 MG/DL — SIGNIFICANT CHANGE UP (ref 8.4–10.5)
CHLORIDE SERPL-SCNC: 97 MMOL/L — SIGNIFICANT CHANGE UP (ref 96–108)
CO2 SERPL-SCNC: 29 MMOL/L — SIGNIFICANT CHANGE UP (ref 22–31)
CREAT SERPL-MCNC: 1.16 MG/DL — SIGNIFICANT CHANGE UP (ref 0.5–1.3)
GLUCOSE SERPL-MCNC: 128 MG/DL — HIGH (ref 70–99)
HCT VFR BLD CALC: 34.9 % — LOW (ref 39–50)
HGB BLD-MCNC: 12.6 G/DL — LOW (ref 13–17)
MCHC RBC-ENTMCNC: 32.6 PG — SIGNIFICANT CHANGE UP (ref 27–34)
MCHC RBC-ENTMCNC: 36 GM/DL — SIGNIFICANT CHANGE UP (ref 32–36)
MCV RBC AUTO: 90.6 FL — SIGNIFICANT CHANGE UP (ref 80–100)
PLATELET # BLD AUTO: 168 K/UL — SIGNIFICANT CHANGE UP (ref 150–400)
POTASSIUM SERPL-MCNC: 4.4 MMOL/L — SIGNIFICANT CHANGE UP (ref 3.5–5.3)
POTASSIUM SERPL-SCNC: 4.4 MMOL/L — SIGNIFICANT CHANGE UP (ref 3.5–5.3)
RBC # BLD: 3.86 M/UL — LOW (ref 4.2–5.8)
RBC # FLD: 11.7 % — SIGNIFICANT CHANGE UP (ref 10.3–14.5)
SODIUM SERPL-SCNC: 140 MMOL/L — SIGNIFICANT CHANGE UP (ref 135–145)
WBC # BLD: 12.5 K/UL — HIGH (ref 3.8–10.5)
WBC # FLD AUTO: 12.5 K/UL — HIGH (ref 3.8–10.5)

## 2018-07-06 PROCEDURE — 71045 X-RAY EXAM CHEST 1 VIEW: CPT | Mod: 26

## 2018-07-06 RX ADMIN — Medication 50 MILLIGRAM(S): at 06:13

## 2018-07-06 RX ADMIN — ATORVASTATIN CALCIUM 40 MILLIGRAM(S): 80 TABLET, FILM COATED ORAL at 21:46

## 2018-07-06 RX ADMIN — HYDROMORPHONE HYDROCHLORIDE 4 MILLIGRAM(S): 2 INJECTION INTRAMUSCULAR; INTRAVENOUS; SUBCUTANEOUS at 18:17

## 2018-07-06 RX ADMIN — HYDROMORPHONE HYDROCHLORIDE 4 MILLIGRAM(S): 2 INJECTION INTRAMUSCULAR; INTRAVENOUS; SUBCUTANEOUS at 21:46

## 2018-07-06 RX ADMIN — HYDROMORPHONE HYDROCHLORIDE 4 MILLIGRAM(S): 2 INJECTION INTRAMUSCULAR; INTRAVENOUS; SUBCUTANEOUS at 22:20

## 2018-07-06 RX ADMIN — ENOXAPARIN SODIUM 40 MILLIGRAM(S): 100 INJECTION SUBCUTANEOUS at 12:22

## 2018-07-06 RX ADMIN — HYDROMORPHONE HYDROCHLORIDE 4 MILLIGRAM(S): 2 INJECTION INTRAMUSCULAR; INTRAVENOUS; SUBCUTANEOUS at 12:22

## 2018-07-06 RX ADMIN — HYDROMORPHONE HYDROCHLORIDE 4 MILLIGRAM(S): 2 INJECTION INTRAMUSCULAR; INTRAVENOUS; SUBCUTANEOUS at 12:52

## 2018-07-06 RX ADMIN — PANTOPRAZOLE SODIUM 40 MILLIGRAM(S): 20 TABLET, DELAYED RELEASE ORAL at 06:13

## 2018-07-06 RX ADMIN — HYDROMORPHONE HYDROCHLORIDE 4 MILLIGRAM(S): 2 INJECTION INTRAMUSCULAR; INTRAVENOUS; SUBCUTANEOUS at 17:47

## 2018-07-06 NOTE — PROGRESS NOTE ADULT - SUBJECTIVE AND OBJECTIVE BOX
Patient is a 57y old  Male who presents with a chief complaint of "CABG x 3" (03 Jul 2018 07:03)      INTERVAL HISTORY: pain overnight as off PCA pump, couldn't sleep all night    	  MEDICATIONS:  metoprolol succinate ER 50 milliGRAM(s) Oral daily        PHYSICAL EXAM:  T(C): 37.3 (07-06-18 @ 04:43), Max: 37.3 (07-06-18 @ 04:43)  HR: 83 (07-06-18 @ 04:43) (72 - 83)  BP: 117/71 (07-06-18 @ 04:43) (116/68 - 117/71)  RR: 18 (07-06-18 @ 04:43) (18 - 18)  SpO2: 93% (07-06-18 @ 04:43) (93% - 95%)  Wt(kg): --  I&O's Summary    05 Jul 2018 07:01  -  06 Jul 2018 07:00  --------------------------------------------------------  IN: 690 mL / OUT: 1335 mL / NET: -645 mL    06 Jul 2018 07:01  -  06 Jul 2018 12:18  --------------------------------------------------------  IN: 240 mL / OUT: 200 mL / NET: 40 mL          Appearance: Normal	  HEENT:    PERRL, EOMI	  Cardiovascular:  S1 S2, No JVD  Respiratory: Lungs clear to auscultation	  Psychiatry: Alert  Gastrointestinal:  Soft, Non-tender, + BS	  Skin: No rashes, No cyanosis  Extremities:  No edema of LE                                12.6   12.5  )-----------( 168      ( 06 Jul 2018 06:37 )             34.9     07-06    140  |  97  |  22  ----------------------------<  128<H>  4.4   |  29  |  1.16    Ca    9.0      06 Jul 2018 06:37          Labs personally reviewed      Problem/Plan - 1:  ·  Problem: Atherosclerotic heart disease of native coronary artery s/p CABG x 2.  Plan: Ding well   Discussed with CTS PA re pain meds - will optimize  Uptitrate Metoprolol as BP tolerates  Statin  Resume ASA    Problem/Plan - 2:  ·  Problem: HTN (hypertension).  Plan: On Metoprolol, will uptitrate as BP tolerates  - off home CCB    Problem/Plan - 3:  HLD - c/w statin      Problem/Plan - 4:  ·  Problem: Need for prophylactic measure.  Plan: On Lovenox  	    Discharge planning for Sunday/Monday per CTS      Ovidio Vega DO [facility]  Cardiovascular Medicine  [phone]

## 2018-07-06 NOTE — DISCHARGE NOTE ADULT - MEDICATION SUMMARY - MEDICATIONS TO TAKE
I will START or STAY ON the medications listed below when I get home from the hospital:    aspirin 325 mg oral delayed release tablet  -- 1 tab(s) by mouth once a day  -- Indication: For Heart health    HYDROmorphone 2 mg oral tablet  -- 1 tab(s) by mouth every 4 hours, As Needed FOR MODERATE PAIN  may take 2  tabs for severe pain MDD:6  -- Indication: For Pain    rosuvastatin 40 mg oral tablet  -- 1 tab(s) by mouth once a day (at bedtime)  -- Indication: For HLD (hyperlipidemia)    ezetimibe 10 mg oral tablet  -- 1 tab(s) by mouth once a day (at bedtime)  -- Indication: For HLD (hyperlipidemia)    metoprolol succinate 25 mg oral tablet, extended release  -- 3 tab(s) by mouth once a day  -- Indication: For Hr   bp    polyethylene glycol 3350 oral powder for reconstitution  -- 17 gram(s) by mouth once a day  While on narcotocs   hold for loose stool  -- Indication: For bm    omeprazole 40 mg oral delayed release capsule  -- 1 cap(s) by mouth once a day  -- Indication: For Gi    Multiple Vitamins oral tablet  -- 1 tab(s) by mouth once a day (at bedtime)  -- Indication: For Supplement

## 2018-07-06 NOTE — DISCHARGE NOTE ADULT - CARE PROVIDERS DIRECT ADDRESSES
[email or web address]

## 2018-07-06 NOTE — DISCHARGE NOTE ADULT - OTHER SIGNIFICANT FINDINGS
s   I feel well, np palpitations"  COR  RRR  MT  stable  GI   soft ND   NT nd      ext   no edema   no calf tenderness

## 2018-07-06 NOTE — PROGRESS NOTE ADULT - ASSESSMENT
57M- PMH CAD, HTN, HLD, GERD now POD 1 C3B  Post op course unremarkable. Started on BB and tolerating. Solis out and voiding w/o difficulty. PCA for pain which is well controlled.   7/5 VSS - multi-disciplinary rounds with Dr. Sims - left CT removed CXR ok- Right CT removed - CXR ok  7/6    ambulating     VSS   NSR   occasional junctional rhythm

## 2018-07-06 NOTE — PROGRESS NOTE ADULT - SUBJECTIVE AND OBJECTIVE BOX
VITAL SIGNS    Telemetry:  sr    60 with junctional at times    Vital Signs Last 24 Hrs  T(C): 37.3 (18 @ 04:43), Max: 37.3 (18 @ 04:43)  T(F): 99.1 (18 @ 04:43), Max: 99.1 (18 @ 04:43)  HR: 83 (18 @ 04:43) (72 - 83)  BP: 117/71 (18 @ 04:43) (116/68 - 117/71)  RR: 18 (18 @ 04:43) (18 - 18)  SpO2: 93% (18 @ 04:43) (93% - 95%)            Daily Weight in k.9 (2018 07:52)        CAPILLARY BLOOD GLUCOSE      POCT Blood Glucose.: 107 mg/dL (2018 21:13)  POCT Blood Glucose.: 119 mg/dL (2018 16:40)  POCT Blood Glucose.: 126 mg/dL (2018 11:43)                          PHYSICAL EXAM    Neurology: alert and oriented x 3, moves all extremities with no defecits  CV :  RRR  Sternal Wound :  CDI , Stable  with pw  Lungs:   CTA B/L  Abdomen: soft, nontender, nondistended, positive bowel sounds, last bowel movement   preop  Extremities: VITAL SIGNS    Telemetry:  sr    60 with junctional at times    Vital Signs Last 24 Hrs  T(C): 37.3 (18 @ 04:43), Max: 37.3 (18 @ 04:43)  T(F): 99.1 (18 @ 04:43), Max: 99.1 (18 @ 04:43)  HR: 83 (18 @ 04:43) (72 - 83)  BP: 117/71 (18 @ 04:43) (116/68 - 117/71)  RR: 18 (18 @ 04:43) (18 - 18)  SpO2: 93% (18 @ 04:43) (93% - 95%)            Daily Weight in k.9 (2018 07:52)        CAPILLARY BLOOD GLUCOSE      POCT Blood Glucose.: 107 mg/dL (2018 21:13)  POCT Blood Glucose.: 119 mg/dL (2018 16:40)  POCT Blood Glucose.: 126 mg/dL (2018 11:43)                          PHYSICAL EXAM    Neurology: alert and oriented x 3, moves all extremities with no defecits  CV :  RRR  Sternal Wound :  CDI , Stable  with pw  Lungs:   CTA B/L  Abdomen: soft, nontender, nondistended, positive bowel sounds, last bowel movement   preop  Extremities:       trace le edema

## 2018-07-06 NOTE — DISCHARGE NOTE ADULT - CARE PROVIDER_API CALL
Chilango Sims), Surgery; Thoracic and Cardiac Surgery  [address] [place] [address]  Phone: [phone]  Fax: [phone] Chilango Sims), Surgery; Thoracic and Cardiac Surgery  [address] [place] [address]  Phone: [phone]  Fax: [phone]    El Cash), Cardiovascular Disease; Internal Medicine  [address] [facility] [address]  [place] [address]  Phone: [phone]  Fax: [phone]

## 2018-07-06 NOTE — DISCHARGE NOTE ADULT - MEDICATION SUMMARY - MEDICATIONS TO CHANGE
I will SWITCH the dose or number of times a day I take the medications listed below when I get home from the hospital:    metoprolol succinate 100 mg oral tablet, extended release  -- 1 tab(s) by mouth once a day (at bedtime)

## 2018-07-06 NOTE — DISCHARGE NOTE ADULT - HOSPITAL COURSE
· Assessment		  57M- PMH CAD, HTN, HLD, GERD now s/p 7/3/18 CABG X 3 BIMA.   Post op course unremarkable.  Started on BB and tolerating. Solis out and voiding w/o difficulty. PCA for pain which is well controlled.   7/5 VSS;  multi-disciplinary rounds with Dr. Sims - left CT removed CXR ok- Right CT removed - CXR ok  7/6 Ambulating; VSS; Occasional junctional rhythm / NSR --> Tolerated Toprol 50 mg PO   7/7 VVS; PW D/C'd; Increase ambulation   7/8   VSS   occ acc junc  on BB,   shower   ambulating · Assessment		  57M- PMH CAD, HTN, HLD, GERD now s/p 7/3/18 CABG X 3 BIMA.   Post op course unremarkable.  Started on BB and tolerating. Solis out and voiding w/o difficulty. PCA for pain which is well controlled.   7/5 VSS;  multi-disciplinary rounds with Dr. Sims - left CT removed CXR ok- Right CT removed - CXR ok  7/6 Ambulating; VSS; Occasional junctional rhythm / NSR --> Tolerated Toprol 50 mg PO   7/7 VVS; PW D/C'd; Increase ambulation   7/8   VSS   occ acc junc  VS sr   on BB,   shower   ambulating · Assessment		  57M- PMH CAD, HTN, HLD, GERD now s/p 7/3/18 CABG X 3 BIMA.   Post op course unremarkable.  Started on BB and tolerating. Solis out and voiding w/o difficulty. PCA for pain which is well controlled.   7/5 VSS;  multi-disciplinary rounds with Dr. Sims - left CT removed CXR ok- Right CT removed - CXR ok  7/6 Ambulating; VSS; Occasional junctional rhythm / NSR --> Tolerated Toprol 50 mg PO   7/7 VVS; PW D/C'd; Increase ambulation   7/8   VSS   occ acc junc  VS sr   on BB,   shower   ambulating  7/9    EP cslt,  ?acc junctional is NSR    BB  ok · Assessment		  57M- PMH CAD, HTN, HLD, GERD now s/p 7/3/18 CABG X 3 BIMA.   Post op course unremarkable.  Started on BB and tolerating. Solis out and voiding w/o difficulty. PCA for pain which is well controlled.   7/5 VSS;  multi-disciplinary rounds with Dr. Sims - left CT removed CXR ok- Right CT removed - CXR ok  7/6 Ambulating; VSS; Occasional junctional rhythm / NSR --> Tolerated Toprol 50 mg PO   7/7 VVS; PW D/C'd; Increase ambulation   7/8   VSS   occ acc junc  VS sr   on BB,   shower   ambulating  7/9    EP cslt,  ?acc junctional is NSR    BB  ok  7/10   75 ng toprol,  vss   d/c home

## 2018-07-06 NOTE — DISCHARGE NOTE ADULT - CARE PLAN
Principal Discharge DX:	Atherosclerotic heart disease of native coronary artery without angina pectoris  Goal:	recovery  Assessment and plan of treatment:	shower daily

## 2018-07-06 NOTE — DISCHARGE NOTE ADULT - PATIENT PORTAL LINK FT
You can access the [email or web address], offered by [facility], by registering with the following website: [email or web address]

## 2018-07-06 NOTE — DISCHARGE NOTE ADULT - ADDITIONAL INSTRUCTIONS
see Dr Sims..  see your cardiologiat 2 adele see Dr Sims..  jULY 30TH 1 PM  see your cardiologiat 2 weeKS   DR SINGLETARY see Dr Sims..  jULY 30TH 1 PM  see your cardiologist in 2 weeKS   DR SINGLETARY

## 2018-07-07 LAB
ANION GAP SERPL CALC-SCNC: 13 MMOL/L — SIGNIFICANT CHANGE UP (ref 5–17)
BUN SERPL-MCNC: 18 MG/DL — SIGNIFICANT CHANGE UP (ref 7–23)
CALCIUM SERPL-MCNC: 8.8 MG/DL — SIGNIFICANT CHANGE UP (ref 8.4–10.5)
CHLORIDE SERPL-SCNC: 98 MMOL/L — SIGNIFICANT CHANGE UP (ref 96–108)
CO2 SERPL-SCNC: 28 MMOL/L — SIGNIFICANT CHANGE UP (ref 22–31)
CREAT SERPL-MCNC: 0.98 MG/DL — SIGNIFICANT CHANGE UP (ref 0.5–1.3)
GLUCOSE SERPL-MCNC: 117 MG/DL — HIGH (ref 70–99)
HCT VFR BLD CALC: 36.4 % — LOW (ref 39–50)
HGB BLD-MCNC: 12.3 G/DL — LOW (ref 13–17)
MCHC RBC-ENTMCNC: 30.8 PG — SIGNIFICANT CHANGE UP (ref 27–34)
MCHC RBC-ENTMCNC: 33.8 GM/DL — SIGNIFICANT CHANGE UP (ref 32–36)
MCV RBC AUTO: 91.1 FL — SIGNIFICANT CHANGE UP (ref 80–100)
PLATELET # BLD AUTO: 181 K/UL — SIGNIFICANT CHANGE UP (ref 150–400)
POTASSIUM SERPL-MCNC: 4 MMOL/L — SIGNIFICANT CHANGE UP (ref 3.5–5.3)
POTASSIUM SERPL-SCNC: 4 MMOL/L — SIGNIFICANT CHANGE UP (ref 3.5–5.3)
RBC # BLD: 4 M/UL — LOW (ref 4.2–5.8)
RBC # FLD: 11.6 % — SIGNIFICANT CHANGE UP (ref 10.3–14.5)
SODIUM SERPL-SCNC: 139 MMOL/L — SIGNIFICANT CHANGE UP (ref 135–145)
WBC # BLD: 8.8 K/UL — SIGNIFICANT CHANGE UP (ref 3.8–10.5)
WBC # FLD AUTO: 8.8 K/UL — SIGNIFICANT CHANGE UP (ref 3.8–10.5)

## 2018-07-07 RX ORDER — ASPIRIN/CALCIUM CARB/MAGNESIUM 324 MG
325 TABLET ORAL DAILY
Qty: 0 | Refills: 0 | Status: DISCONTINUED | OUTPATIENT
Start: 2018-07-07 | End: 2018-07-10

## 2018-07-07 RX ADMIN — HYDROMORPHONE HYDROCHLORIDE 4 MILLIGRAM(S): 2 INJECTION INTRAMUSCULAR; INTRAVENOUS; SUBCUTANEOUS at 02:10

## 2018-07-07 RX ADMIN — ATORVASTATIN CALCIUM 40 MILLIGRAM(S): 80 TABLET, FILM COATED ORAL at 21:38

## 2018-07-07 RX ADMIN — HYDROMORPHONE HYDROCHLORIDE 4 MILLIGRAM(S): 2 INJECTION INTRAMUSCULAR; INTRAVENOUS; SUBCUTANEOUS at 06:55

## 2018-07-07 RX ADMIN — HYDROMORPHONE HYDROCHLORIDE 4 MILLIGRAM(S): 2 INJECTION INTRAMUSCULAR; INTRAVENOUS; SUBCUTANEOUS at 06:24

## 2018-07-07 RX ADMIN — HYDROMORPHONE HYDROCHLORIDE 4 MILLIGRAM(S): 2 INJECTION INTRAMUSCULAR; INTRAVENOUS; SUBCUTANEOUS at 10:30

## 2018-07-07 RX ADMIN — Medication 50 MILLIGRAM(S): at 06:23

## 2018-07-07 RX ADMIN — PANTOPRAZOLE SODIUM 40 MILLIGRAM(S): 20 TABLET, DELAYED RELEASE ORAL at 06:23

## 2018-07-07 RX ADMIN — HYDROMORPHONE HYDROCHLORIDE 4 MILLIGRAM(S): 2 INJECTION INTRAMUSCULAR; INTRAVENOUS; SUBCUTANEOUS at 22:22

## 2018-07-07 RX ADMIN — HYDROMORPHONE HYDROCHLORIDE 2 MILLIGRAM(S): 2 INJECTION INTRAMUSCULAR; INTRAVENOUS; SUBCUTANEOUS at 16:00

## 2018-07-07 RX ADMIN — HYDROMORPHONE HYDROCHLORIDE 2 MILLIGRAM(S): 2 INJECTION INTRAMUSCULAR; INTRAVENOUS; SUBCUTANEOUS at 15:38

## 2018-07-07 RX ADMIN — HYDROMORPHONE HYDROCHLORIDE 4 MILLIGRAM(S): 2 INJECTION INTRAMUSCULAR; INTRAVENOUS; SUBCUTANEOUS at 21:39

## 2018-07-07 RX ADMIN — POLYETHYLENE GLYCOL 3350 17 GRAM(S): 17 POWDER, FOR SOLUTION ORAL at 11:24

## 2018-07-07 RX ADMIN — HYDROMORPHONE HYDROCHLORIDE 4 MILLIGRAM(S): 2 INJECTION INTRAMUSCULAR; INTRAVENOUS; SUBCUTANEOUS at 09:58

## 2018-07-07 RX ADMIN — ENOXAPARIN SODIUM 40 MILLIGRAM(S): 100 INJECTION SUBCUTANEOUS at 11:25

## 2018-07-07 RX ADMIN — Medication 325 MILLIGRAM(S): at 14:19

## 2018-07-07 RX ADMIN — HYDROMORPHONE HYDROCHLORIDE 4 MILLIGRAM(S): 2 INJECTION INTRAMUSCULAR; INTRAVENOUS; SUBCUTANEOUS at 01:42

## 2018-07-07 NOTE — PROGRESS NOTE ADULT - ASSESSMENT
57M- PMH CAD, HTN, HLD, GERD now s/p 7/3/18 CABG X 3 BIMA.   Post op course unremarkable.  Started on BB and tolerating. Solis out and voiding w/o difficulty. PCA for pain which is well controlled.   7/5 VSS;  multi-disciplinary rounds with Dr. Sims - left CT removed CXR ok- Right CT removed - CXR ok  7/6 Ambulating; VSS; Occasional junctional rhythm / NSR --> Tolerated Toprol 50 mg PO 57M- PMH CAD, HTN, HLD, GERD now s/p 7/3/18 CABG X 3 BIMA.   Post op course unremarkable.  Started on BB and tolerating. Solis out and voiding w/o difficulty. PCA for pain which is well controlled.   7/5 VSS;  multi-disciplinary rounds with Dr. Sims - left CT removed CXR ok- Right CT removed - CXR ok  7/6 Ambulating; VSS; Occasional junctional rhythm / NSR --> Tolerated Toprol 50 mg PO   7/7 VVS; PW D/C'd; Increase ambulation   Disposition: Home PT Sunday / Monday

## 2018-07-07 NOTE — PROGRESS NOTE ADULT - PROBLEM SELECTOR PLAN 1
ambulate   Home PT   sat/sun Continue with  mg PO daily   Continue with Toprol 50 mg PO daily   Continue with statin   Increase activity as tolerated   Shower on POD # 5   D/C PW   Continue with PUD and DVT prophylaxis   Pain management   D/C Plan Home PT Sunday / Monday   Plan of care discussed with attending

## 2018-07-07 NOTE — PROGRESS NOTE ADULT - PROBLEM SELECTOR PLAN 2
dilaudid po Continue with Dilaudid 2 mg PO every 3 hours as needed for moderate pain   Continue with Dilaudid 4 mg PO every 3 hours as needed for severe pain

## 2018-07-07 NOTE — PROGRESS NOTE ADULT - SUBJECTIVE AND OBJECTIVE BOX
Patient is a 57y old  Male who presents with a chief complaint of "CABG x 3" (06 Jul 2018 13:16)      INTERVAL HISTORY: feels better, pain better controlled  	  MEDICATIONS:  metoprolol succinate ER 50 milliGRAM(s) Oral daily        PHYSICAL EXAM:  T(C): 37.2 (07-07-18 @ 20:05), Max: 37.2 (07-07-18 @ 20:05)  HR: 92 (07-07-18 @ 20:05) (87 - 92)  BP: 112/71 (07-07-18 @ 20:05) (112/71 - 130/77)  RR: 18 (07-07-18 @ 20:05) (16 - 18)  SpO2: 95% (07-07-18 @ 20:05) (93% - 96%)  Wt(kg): --  I&O's Summary    06 Jul 2018 07:01  -  07 Jul 2018 07:00  --------------------------------------------------------  IN: 600 mL / OUT: 1100 mL / NET: -500 mL    07 Jul 2018 07:01  -  07 Jul 2018 23:33  --------------------------------------------------------  IN: 1100 mL / OUT: 1050 mL / NET: 50 mL          Appearance: Normal	  HEENT:    PERRL, EOMI	  Cardiovascular:  S1 S2, No JVD  Respiratory: Lungs clear to auscultation	  Psychiatry: Alert  Gastrointestinal:  Soft, Non-tender, + BS	  Skin: No rashes, No cyanosis  Extremities:  No edema of LE                                12.3   8.8   )-----------( 181      ( 07 Jul 2018 06:59 )             36.4     07-07    139  |  98  |  18  ----------------------------<  117<H>  4.0   |  28  |  0.98    Ca    8.8      07 Jul 2018 06:59          Labs personally reviewed      Problem/Plan - 1:  ·  Problem: Atherosclerotic heart disease of native coronary artery s/p CABG x 2.  Plan: Ding well   Discussed with CTS PA re pain meds - will optimize  Uptitrate Metoprolol as BP tolerates  Statin   ASA    Problem/Plan - 2:  ·  Problem: HTN (hypertension).  Plan: On Metoprolol, will uptitrate as BP tolerates  - off home CCB    Problem/Plan - 3:  HLD - c/w statin      Problem/Plan - 4:  ·  Problem: Need for prophylactic measure.  Plan: On Lovenox  	    Discharge planning for Monday       Ovidio Vega DO [facility]  Cardiovascular Medicine  [phone]

## 2018-07-07 NOTE — PROGRESS NOTE ADULT - SUBJECTIVE AND OBJECTIVE BOX
VITAL SIGNS    Subjective: "I'm not ready to go home today, I need an extra day." Denies CP, palpitation, SOB, FLORES, HA, dizziness, N/V/D, fever or chills.  No acute event noted overnight.     Telemetry:  NSR 70-80     Vital Signs Last 24 Hrs  T(C): 36.9 (18 @ 05:27), Max: 36.9 (18 @ 20:11)  T(F): 98.4 (18 @ 05:27), Max: 98.5 (18 @ 20:11)  HR: 87 (18 @ 05:27) (78 - 87)  BP: 130/74 (18 @ 05:27) (119/69 - 130/74)  RR: 17 (18 @ 05:27) (17 - 18)  SpO2: 96% (18 @ 05:27) (95% - 96%)            @ 07:  -   @ 07:00  --------------------------------------------------------  IN: 600 mL / OUT: 1100 mL / NET: -500 mL     @ 07:01  -   @ 11:35  --------------------------------------------------------  IN: 360 mL / OUT: 300 mL / NET: 60 mL    Daily     Daily Weight in k.6 (2018 07:56)                 PHYSICAL EXAM    Neurology: alert and oriented x 3, nonfocal, no gross deficits    CV: (+) S1 and S2, No murmurs, rubs, gallops or clicks     Sternal Wound:  MSI -->CDI , sternum stable; PW x 2 --> Isolated     Lungs: CTA B/L     Abdomen: soft, nontender, nondistended, positive bowel sounds, (+) Flatus; (+) BM     :  Voiding               Extremities:  B/L LE (+) trace edema; negative calf tenderness; (+) 2 DP palpable        atorvastatin 40 milliGRAM(s) Oral at bedtime  enoxaparin Injectable 40 milliGRAM(s) SubCutaneous daily  HYDROmorphone Tablet 2 milliGRAM(s) Oral every 3 hours PRN  HYDROmorphone Tablet 4 milliGRAM(s) Oral every 3 hours PRN  metoprolol succinate ER 50 milliGRAM(s) Oral daily  pantoprazole Tablet 40 milliGRAM(s) Oral before breakfast  polyethylene glycol 3350 17 Gram(s) Oral daily    Physical Therapy Rec:   Home  [  ]   Home w/ PT  [ X ]  Rehab  [  ]    Discussed with Cardiothoracic Team at AM rounds.

## 2018-07-08 LAB
ANION GAP SERPL CALC-SCNC: 12 MMOL/L — SIGNIFICANT CHANGE UP (ref 5–17)
BUN SERPL-MCNC: 20 MG/DL — SIGNIFICANT CHANGE UP (ref 7–23)
CALCIUM SERPL-MCNC: 8.9 MG/DL — SIGNIFICANT CHANGE UP (ref 8.4–10.5)
CHLORIDE SERPL-SCNC: 97 MMOL/L — SIGNIFICANT CHANGE UP (ref 96–108)
CO2 SERPL-SCNC: 30 MMOL/L — SIGNIFICANT CHANGE UP (ref 22–31)
CREAT SERPL-MCNC: 1.02 MG/DL — SIGNIFICANT CHANGE UP (ref 0.5–1.3)
GLUCOSE SERPL-MCNC: 114 MG/DL — HIGH (ref 70–99)
HCT VFR BLD CALC: 35 % — LOW (ref 39–50)
HGB BLD-MCNC: 12.3 G/DL — LOW (ref 13–17)
MCHC RBC-ENTMCNC: 31.7 PG — SIGNIFICANT CHANGE UP (ref 27–34)
MCHC RBC-ENTMCNC: 35 GM/DL — SIGNIFICANT CHANGE UP (ref 32–36)
MCV RBC AUTO: 90.6 FL — SIGNIFICANT CHANGE UP (ref 80–100)
PLATELET # BLD AUTO: 210 K/UL — SIGNIFICANT CHANGE UP (ref 150–400)
POTASSIUM SERPL-MCNC: 3.9 MMOL/L — SIGNIFICANT CHANGE UP (ref 3.5–5.3)
POTASSIUM SERPL-SCNC: 3.9 MMOL/L — SIGNIFICANT CHANGE UP (ref 3.5–5.3)
RBC # BLD: 3.86 M/UL — LOW (ref 4.2–5.8)
RBC # FLD: 11.6 % — SIGNIFICANT CHANGE UP (ref 10.3–14.5)
SODIUM SERPL-SCNC: 139 MMOL/L — SIGNIFICANT CHANGE UP (ref 135–145)
WBC # BLD: 8.7 K/UL — SIGNIFICANT CHANGE UP (ref 3.8–10.5)
WBC # FLD AUTO: 8.7 K/UL — SIGNIFICANT CHANGE UP (ref 3.8–10.5)

## 2018-07-08 RX ORDER — SORBITOL SOLUTION 70 %
15 SOLUTION, ORAL MISCELLANEOUS ONCE
Qty: 0 | Refills: 0 | Status: COMPLETED | OUTPATIENT
Start: 2018-07-08 | End: 2018-07-08

## 2018-07-08 RX ADMIN — PANTOPRAZOLE SODIUM 40 MILLIGRAM(S): 20 TABLET, DELAYED RELEASE ORAL at 05:00

## 2018-07-08 RX ADMIN — HYDROMORPHONE HYDROCHLORIDE 2 MILLIGRAM(S): 2 INJECTION INTRAMUSCULAR; INTRAVENOUS; SUBCUTANEOUS at 17:23

## 2018-07-08 RX ADMIN — ATORVASTATIN CALCIUM 40 MILLIGRAM(S): 80 TABLET, FILM COATED ORAL at 21:34

## 2018-07-08 RX ADMIN — Medication 15 MILLILITER(S): at 10:20

## 2018-07-08 RX ADMIN — HYDROMORPHONE HYDROCHLORIDE 2 MILLIGRAM(S): 2 INJECTION INTRAMUSCULAR; INTRAVENOUS; SUBCUTANEOUS at 04:55

## 2018-07-08 RX ADMIN — Medication 325 MILLIGRAM(S): at 12:47

## 2018-07-08 RX ADMIN — HYDROMORPHONE HYDROCHLORIDE 4 MILLIGRAM(S): 2 INJECTION INTRAMUSCULAR; INTRAVENOUS; SUBCUTANEOUS at 23:33

## 2018-07-08 RX ADMIN — Medication 50 MILLIGRAM(S): at 05:00

## 2018-07-08 RX ADMIN — ENOXAPARIN SODIUM 40 MILLIGRAM(S): 100 INJECTION SUBCUTANEOUS at 12:47

## 2018-07-08 RX ADMIN — POLYETHYLENE GLYCOL 3350 17 GRAM(S): 17 POWDER, FOR SOLUTION ORAL at 12:47

## 2018-07-08 RX ADMIN — HYDROMORPHONE HYDROCHLORIDE 2 MILLIGRAM(S): 2 INJECTION INTRAMUSCULAR; INTRAVENOUS; SUBCUTANEOUS at 10:20

## 2018-07-08 RX ADMIN — HYDROMORPHONE HYDROCHLORIDE 2 MILLIGRAM(S): 2 INJECTION INTRAMUSCULAR; INTRAVENOUS; SUBCUTANEOUS at 10:50

## 2018-07-08 NOTE — PROGRESS NOTE ADULT - SUBJECTIVE AND OBJECTIVE BOX
Patient is a 57y old  Male who presents with a chief complaint of "CABG x 3" (06 Jul 2018 13:16)      INTERVAL HISTORY: feels better , constipated    TELEMETRY: sinus tach  	  MEDICATIONS:  metoprolol succinate ER 50 milliGRAM(s) Oral daily        PHYSICAL EXAM:  T(C): 37 (07-08-18 @ 19:24), Max: 37 (07-08-18 @ 19:24)  HR: 89 (07-08-18 @ 19:24) (85 - 109)  BP: 123/80 (07-08-18 @ 19:24) (103/68 - 123/80)  RR: 18 (07-08-18 @ 19:24) (16 - 18)  SpO2: 98% (07-08-18 @ 19:24) (92% - 98%)  Wt(kg): --  I&O's Summary    07 Jul 2018 07:01  -  08 Jul 2018 07:00  --------------------------------------------------------  IN: 1100 mL / OUT: 1500 mL / NET: -400 mL    08 Jul 2018 07:01  -  08 Jul 2018 22:40  --------------------------------------------------------  IN: 520 mL / OUT: 1025 mL / NET: -505 mL          Appearance: Normal	  HEENT:    PERRL, EOMI	  Cardiovascular:  S1 S2, No JVD  Respiratory: Lungs clear to auscultation	  Psychiatry: Alert  Gastrointestinal:  Soft, Non-tender, + BS	  Skin: No rashes, No cyanosis  Extremities:  No edema of LE                                12.3   8.7   )-----------( 210      ( 08 Jul 2018 06:41 )             35.0     07-08    139  |  97  |  20  ----------------------------<  114<H>  3.9   |  30  |  1.02    Ca    8.9      08 Jul 2018 06:41          Labs personally reviewed      Problem/Plan - 1:  ·  Problem: Atherosclerotic heart disease of native coronary artery s/p CABG x 2.  Plan: Ding well   Discussed with CTS PA re pain meds - will optimize  Uptitrate Metoprolol to 100mg daily for discharge  Statin   ASA    Problem/Plan - 2:  ·  Problem: HTN (hypertension).  Plan: On Metoprolol, will uptitrate as BP tolerates  - off home CCB    Problem/Plan - 3:  HLD - c/w statin      Problem/Plan - 4:  ·  Problem: Need for prophylactic measure.  Plan: On Lovenox  	    Suppository for constipation      Ovidio Vega DO [facility]  Cardiovascular Medicine  [phone]

## 2018-07-08 NOTE — PROGRESS NOTE ADULT - PROBLEM SELECTOR PLAN 1
Continue with  mg PO daily    Toprol 50 mg PO ralph  D/C Plan Home PT / Monday   Plan of care discussed with attending

## 2018-07-08 NOTE — PROGRESS NOTE ADULT - SUBJECTIVE AND OBJECTIVE BOX
VITAL SIGNS    Telemetry:     sr   with occ acc [facility]    Vital Signs Last 24 Hrs  T(C): 36.7 (18 @ 04:51), Max: 37.2 (18 @ 20:05)  T(F): 98.1 (18 @ 04:51), Max: 99 (18 @ 20:05)  HR: 85 (18 @ 04:51) (85 - 92)  BP: 123/79 (18 @ 04:51) (112/71 - 130/77)  RR: 17 (18 @ 04:51) (16 - 18)  SpO2: 92% (18 @ 04:51) (92% - 95%)          Daily Weight in k (2018 10:16)        CAPILLARY BLOOD GLUCOSE                        PHYSICAL EXAM    Neurology: alert and oriented x 3, moves all extremities with no defecits  CV :  RRR  Sternal Wound :  CDI , Stable  Lungs:   CTA B/L  Abdomen: soft, nontender, nondistended, positive bowel sounds   Extremities:   no edema    no calf pain

## 2018-07-08 NOTE — PROGRESS NOTE ADULT - ASSESSMENT
57M- PMH CAD, HTN, HLD, GERD now s/p 7/3/18 CABG X 3 BIMA.   Post op course unremarkable.  Started on BB and tolerating. [employer]is out and voiding w/o difficulty. PCA for pain which is well controlled.   7/5 VSS;  multi-disciplinary rounds with Dr. Sims - left CT removed CXR ok- Right CT removed - CXR ok  7/6 Ambulating; VSS; Occasional junctional rhythm / NSR --> Tolerated Toprol 50 mg PO   7/7 VVS; PW D/C'd; Increase ambulation   7/8   VSS   occ acc junc  on BB,   shower   ambulating

## 2018-07-08 NOTE — PROGRESS NOTE ADULT - PROBLEM SELECTOR PLAN 2
Continue with Dilaudid 2 mg PO every 3 hours as needed for moderate pain   Continue with Dilaudid 4 mg PO every 3 hours as needed for severe pain

## 2018-07-09 LAB
ANION GAP SERPL CALC-SCNC: 11 MMOL/L — SIGNIFICANT CHANGE UP (ref 5–17)
BUN SERPL-MCNC: 18 MG/DL — SIGNIFICANT CHANGE UP (ref 7–23)
CALCIUM SERPL-MCNC: 8.9 MG/DL — SIGNIFICANT CHANGE UP (ref 8.4–10.5)
CHLORIDE SERPL-SCNC: 97 MMOL/L — SIGNIFICANT CHANGE UP (ref 96–108)
CO2 SERPL-SCNC: 29 MMOL/L — SIGNIFICANT CHANGE UP (ref 22–31)
CREAT SERPL-MCNC: 1.05 MG/DL — SIGNIFICANT CHANGE UP (ref 0.5–1.3)
GLUCOSE SERPL-MCNC: 145 MG/DL — HIGH (ref 70–99)
HCT VFR BLD CALC: 36.3 % — LOW (ref 39–50)
HGB BLD-MCNC: 12.8 G/DL — LOW (ref 13–17)
MCHC RBC-ENTMCNC: 32.2 PG — SIGNIFICANT CHANGE UP (ref 27–34)
MCHC RBC-ENTMCNC: 35.2 GM/DL — SIGNIFICANT CHANGE UP (ref 32–36)
MCV RBC AUTO: 91.6 FL — SIGNIFICANT CHANGE UP (ref 80–100)
PLATELET # BLD AUTO: 235 K/UL — SIGNIFICANT CHANGE UP (ref 150–400)
POTASSIUM SERPL-MCNC: 4.4 MMOL/L — SIGNIFICANT CHANGE UP (ref 3.5–5.3)
POTASSIUM SERPL-SCNC: 4.4 MMOL/L — SIGNIFICANT CHANGE UP (ref 3.5–5.3)
RBC # BLD: 3.96 M/UL — LOW (ref 4.2–5.8)
RBC # FLD: 11.6 % — SIGNIFICANT CHANGE UP (ref 10.3–14.5)
SODIUM SERPL-SCNC: 137 MMOL/L — SIGNIFICANT CHANGE UP (ref 135–145)
WBC # BLD: 8.4 K/UL — SIGNIFICANT CHANGE UP (ref 3.8–10.5)
WBC # FLD AUTO: 8.4 K/UL — SIGNIFICANT CHANGE UP (ref 3.8–10.5)

## 2018-07-09 RX ORDER — METOPROLOL TARTRATE 50 MG
75 TABLET ORAL DAILY
Qty: 0 | Refills: 0 | Status: DISCONTINUED | OUTPATIENT
Start: 2018-07-10 | End: 2018-07-10

## 2018-07-09 RX ORDER — POLYETHYLENE GLYCOL 3350 17 G/17G
17 POWDER, FOR SOLUTION ORAL
Qty: 0 | Refills: 0 | COMMUNITY
Start: 2018-07-09

## 2018-07-09 RX ORDER — ASPIRIN/CALCIUM CARB/MAGNESIUM 324 MG
1 TABLET ORAL
Qty: 0 | Refills: 0 | COMMUNITY

## 2018-07-09 RX ORDER — SIMETHICONE 80 MG/1
80 TABLET, CHEWABLE ORAL EVERY 4 HOURS
Qty: 0 | Refills: 0 | Status: DISCONTINUED | OUTPATIENT
Start: 2018-07-09 | End: 2018-07-10

## 2018-07-09 RX ORDER — HYDROMORPHONE HYDROCHLORIDE 2 MG/ML
1 INJECTION INTRAMUSCULAR; INTRAVENOUS; SUBCUTANEOUS
Qty: 30 | Refills: 0 | OUTPATIENT
Start: 2018-07-09 | End: 2018-07-13

## 2018-07-09 RX ORDER — DOCUSATE SODIUM 100 MG
100 CAPSULE ORAL THREE TIMES A DAY
Qty: 0 | Refills: 0 | Status: DISCONTINUED | OUTPATIENT
Start: 2018-07-09 | End: 2018-07-10

## 2018-07-09 RX ORDER — ASPIRIN/CALCIUM CARB/MAGNESIUM 324 MG
1 TABLET ORAL
Qty: 0 | Refills: 0 | COMMUNITY
Start: 2018-07-09

## 2018-07-09 RX ADMIN — HYDROMORPHONE HYDROCHLORIDE 2 MILLIGRAM(S): 2 INJECTION INTRAMUSCULAR; INTRAVENOUS; SUBCUTANEOUS at 08:40

## 2018-07-09 RX ADMIN — Medication 50 MILLIGRAM(S): at 05:26

## 2018-07-09 RX ADMIN — HYDROMORPHONE HYDROCHLORIDE 4 MILLIGRAM(S): 2 INJECTION INTRAMUSCULAR; INTRAVENOUS; SUBCUTANEOUS at 21:10

## 2018-07-09 RX ADMIN — HYDROMORPHONE HYDROCHLORIDE 2 MILLIGRAM(S): 2 INJECTION INTRAMUSCULAR; INTRAVENOUS; SUBCUTANEOUS at 07:49

## 2018-07-09 RX ADMIN — Medication 325 MILLIGRAM(S): at 12:13

## 2018-07-09 RX ADMIN — SIMETHICONE 80 MILLIGRAM(S): 80 TABLET, CHEWABLE ORAL at 18:15

## 2018-07-09 RX ADMIN — Medication 100 MILLIGRAM(S): at 21:11

## 2018-07-09 RX ADMIN — ENOXAPARIN SODIUM 40 MILLIGRAM(S): 100 INJECTION SUBCUTANEOUS at 12:13

## 2018-07-09 RX ADMIN — HYDROMORPHONE HYDROCHLORIDE 2 MILLIGRAM(S): 2 INJECTION INTRAMUSCULAR; INTRAVENOUS; SUBCUTANEOUS at 15:39

## 2018-07-09 RX ADMIN — HYDROMORPHONE HYDROCHLORIDE 2 MILLIGRAM(S): 2 INJECTION INTRAMUSCULAR; INTRAVENOUS; SUBCUTANEOUS at 16:20

## 2018-07-09 RX ADMIN — ATORVASTATIN CALCIUM 40 MILLIGRAM(S): 80 TABLET, FILM COATED ORAL at 21:11

## 2018-07-09 RX ADMIN — PANTOPRAZOLE SODIUM 40 MILLIGRAM(S): 20 TABLET, DELAYED RELEASE ORAL at 07:44

## 2018-07-09 RX ADMIN — HYDROMORPHONE HYDROCHLORIDE 4 MILLIGRAM(S): 2 INJECTION INTRAMUSCULAR; INTRAVENOUS; SUBCUTANEOUS at 22:00

## 2018-07-09 RX ADMIN — HYDROMORPHONE HYDROCHLORIDE 4 MILLIGRAM(S): 2 INJECTION INTRAMUSCULAR; INTRAVENOUS; SUBCUTANEOUS at 00:05

## 2018-07-09 NOTE — PROGRESS NOTE ADULT - ASSESSMENT
57M- PMH CAD, HTN, HLD, GERD now s/p 7/3/18 CABG X 3 BIMA.   Post op course unremarkable.  Started on BB and tolerating. Solis out and voiding w/o difficulty. PCA for pain which is well controlled.   7/5 VSS;  multi-disciplinary rounds with Dr. Sims - left CT removed CXR ok- Right CT removed - CXR ok  7/6 Ambulating; VSS; Occasional junctional rhythm / NSR --> Tolerated Toprol 50 mg PO   7/7 VVS; PW D/C'd; Increase ambulation   7/8   VSS   occ acc junc  on BB,   shower   ambulating  7/9   ambulating        ep cslt   acc junc ,  remains on toprol 57M- PMH CAD, HTN, HLD, GERD now s/p 7/3/18 CABG X 3 BIMA.   Post op course unremarkable.  Started on BB and tolerating. Solis out and voiding w/o difficulty. PCA for pain which is well controlled.   7/5 VSS;  multi-disciplinary rounds with Dr. Sims - left CT removed CXR ok- Right CT removed - CXR ok  7/6 Ambulating; VSS; Occasional junctional rhythm / NSR --> Tolerated Toprol 50 mg PO   7/7 VVS; PW D/C'd; Increase ambulation   7/8   VSS   occ acc junc  on BB,   shower   ambulating  7/9   ambulating        ep cslt   ?acc junc ,  remains on toprol

## 2018-07-09 NOTE — PROGRESS NOTE ADULT - PROBLEM SELECTOR PLAN 1
Continue with  mg PO daily    Toprol 50 mg PO ralph  D/C Plan Home PT / Monday   after ep cslt  for acc jun  Plan of care discussed with attending

## 2018-07-09 NOTE — PROGRESS NOTE ADULT - PROBLEM SELECTOR PLAN 2
Dilaudid 2 mg PO every 3 hours as needed for moderate pain   Dilaudid 4 mg PO every 3 hours as needed for severe pain

## 2018-07-09 NOTE — PROGRESS NOTE ADULT - SUBJECTIVE AND OBJECTIVE BOX
Patient is a 57y old  Male who presents with a chief complaint of 7/3 C2B (06 Jul 2018 13:16)      INTERVAL HISTORY: feels well    TELEMETRY: sr  	    PHYSICAL EXAM:  T(C): 36.8 (07-09-18 @ 19:26), Max: 36.9 (07-09-18 @ 05:24)  HR: 102 (07-09-18 @ 19:26) (95 - 105)  BP: 109/70 (07-09-18 @ 19:26) (109/70 - 124/86)  RR: 18 (07-09-18 @ 19:26) (18 - 18)  SpO2: 97% (07-09-18 @ 19:26) (97% - 98%)  Wt(kg): --  I&O's Summary    08 Jul 2018 07:01  -  09 Jul 2018 07:00  --------------------------------------------------------  IN: 520 mL / OUT: 2425 mL / NET: -1905 mL    09 Jul 2018 07:01  -  09 Jul 2018 21:56  --------------------------------------------------------  IN: 280 mL / OUT: 400 mL / NET: -120 mL          Appearance: Normal	  HEENT:    PERRL, EOMI	  Cardiovascular:  S1 S2, No JVD  Respiratory: Lungs clear to auscultation	  Psychiatry: Alert  Gastrointestinal:  Soft, Non-tender, + BS	  Skin: No rashes, No cyanosis  Extremities:  No edema of LE                                12.8   8.4   )-----------( 235      ( 09 Jul 2018 08:49 )             36.3     07-09    137  |  97  |  18  ----------------------------<  145<H>  4.4   |  29  |  1.05    Ca    8.9      09 Jul 2018 08:49          Labs personally reviewed      ASSESSMENT/PLAN: 	  Problem/Plan - 1:  ·  Problem: Atherosclerotic heart disease of native coronary artery s/p CABG x 2.  Plan: Ding well   Discussed with CTS PA re pain meds - will optimize  Uptitrate Metoprolol to 100mg daily for discharge  Statin   ASA    Problem/Plan - 2:  ·  Problem: HTN (hypertension).  Plan: On Metoprolol, will uptitrate as BP tolerates  - off home CCB    Problem/Plan - 3:  HLD - c/w statin      Problem/Plan - 4:  ·  Problem: Need for prophylactic measure.  Plan: On Lovenox        Ovidio Vega DO [facility]  Cardiovascular Medicine  [phone]

## 2018-07-09 NOTE — CONSULT NOTE ADULT - SUBJECTIVE AND OBJECTIVE BOX
Date of Admission:    Patient is a 57y old  Male who presents with a chief complaint of "CABG x 3" (06 Jul 2018 13:16)      HISTORY OF PRESENT ILLNESS:     56 yo gentleman w/PMHx HTN, HLD, GERD reported CP found on elective LHC to have 3vCAD now s/p CABGx2 (MARIA ELENA-LAD, LIMA-OM) on 7/3 now optimized per CTSx for discharge EP consulted for intermittent accelerated junctional rhythm.         Allergies  No Known Allergies    MEDICATIONS:  aspirin enteric coated 325 milliGRAM(s) Oral daily  enoxaparin Injectable 40 milliGRAM(s) SubCutaneous daily  metoprolol succinate ER 50 milliGRAM(s) Oral daily  HYDROmorphone   Tablet 2 milliGRAM(s) Oral every 3 hours PRN  HYDROmorphone   Tablet 4 milliGRAM(s) Oral every 3 hours PRN  bisacodyl Suppository 10 milliGRAM(s) Rectal once PRN  pantoprazole    Tablet 40 milliGRAM(s) Oral before breakfast  polyethylene glycol 3350 17 Gram(s) Oral daily  atorvastatin 40 milliGRAM(s) Oral at bedtime        PAST MEDICAL & SURGICAL HISTORY:  Atherosclerotic heart disease of native coronary artery without angina pectoris  GERD (gastroesophageal reflux disease)  HLD (hyperlipidemia)  HTN (hypertension)  No significant past surgical history      FAMILY HISTORY: No significant FHx     SOCIAL HISTORY:    [ ] Non-smoker    REVIEW OF SYSTEMS:    CONSTITUTIONAL: No weakness, fevers or chills  EYES/ENT: No visual changes;  No dysphagia  NECK: No pain or stiffness  RESPIRATORY: No cough, wheezing, hemoptysis; No shortness of breath  CARDIOVASCULAR: No chest pain or palpitations; No lower extremity edema  GASTROINTESTINAL: No abdominal or epigastric pain. No nausea, vomiting, or hematemesis; No diarrhea or constipation. No melena or hematochezia.  BACK: No back pain  GENITOURINARY: No dysuria, frequency or hematuria  NEUROLOGICAL: No numbness or weakness  SKIN: No itching, burning, rashes, or lesions   All other review of systems is negative unless indicated above.    PHYSICAL EXAM:  T(C): 36.9 (07-09-18 @ 05:24), Max: 37 (07-08-18 @ 19:24)  HR: 95 (07-09-18 @ 05:24) (89 - 109)  BP: 116/75 (07-09-18 @ 05:24) (103/68 - 123/80)  RR: 18 (07-09-18 @ 05:24) (16 - 18)  SpO2: 97% (07-09-18 @ 05:24) (95% - 98%)  Wt(kg): --  I&O's Summary    08 Jul 2018 07:01  -  09 Jul 2018 07:00  --------------------------------------------------------  IN: 520 mL / OUT: 2425 mL / NET: -1905 mL        Appearance: Normal	  HEENT:   Normal oral mucosa, PERRL, EOMI	  Lymphatic: No lymphadenopathy  Cardiovascular: Normal S1 S2, No JVD, No murmurs, No edema  Respiratory: Lungs clear to auscultation	  Psychiatry: A & O x 3, Mood & affect appropriate  Gastrointestinal:  Soft, Non-tender, + BS	  Skin: No rashes, No ecchymoses, No cyanosis	  Neurologic: Non-focal  Extremities: Normal range of motion, No clubbing, cyanosis or edema  Vascular: Peripheral pulses palpable 2+ bilaterally        LABS:	 	    CBC Full  -  ( 09 Jul 2018 08:49 )  WBC Count : 8.4 K/uL  Hemoglobin : 12.8 g/dL  Hematocrit : 36.3 %  Platelet Count - Automated : 235 K/uL  Mean Cell Volume : 91.6 fl  Mean Cell Hemoglobin : 32.2 pg  Mean Cell Hemoglobin Concentration : 35.2 gm/dL      07-09    137  |  97  |  18  ----------------------------<  145<H>  4.4   |  29  |  1.05  07-08    139  |  97  |  20  ----------------------------<  114<H>  3.9   |  30  |  1.02    Ca    8.9      09 Jul 2018 08:49  Ca    8.9      08 Jul 2018 06:41    TELEMETRY: 	      ECG:  	  RADIOLOGY: 	  7/6 CXR- no focal consolidation     PREVIOUS DIAGNOSTIC TESTING:    [ ] Echocardiogram: < from: Intra-Operative Transesophageal Echo (07.03.18 @ 14:15) >  Dimensions:    Normal Values:  LA:     4.4    2.0 - 4.0 cm  Ao:     2.8    2.0 - 3.8 cm  SEPTUM:        0.6 - 1.2 cm  PWT:           0.6 - 1.1 cm  LVIDd:         3.0 - 5.6 cm  LVIDs:         1.8 - 4.0 cm  EF (Visual Estimate): 65 %    < end of copied text >  < from: Intra-Operative Transesophageal Echo (07.03.18 @ 14:15) >  Post-Bypass Observations:    Post Bypass/ CABG x2  Post bypass normal left ventricular function. Estimated  ejection fraction 65%. No change to native valves. All  significant findings discussed with the surgeon.  ------------------------------------------------------------------------  Conclusions:  1. Normal mitral valve. Minimal mitral regurgitation.  2. Normal trileaflet aortic valve. No aortic valve  regurgitation seen.  3. Aortic Root: 2.8 cm.  Ascending Aorta: 2.7 cm.  4. Normal left atrium.  5. Mild concentric left ventricular hypertrophy.  6. Normal left ventricular systolic function. No segmental  wall motion abnormalities.  7. Normal right atrium.  8. Normal right ventricular size and function.  9. Normal tricuspid valve. Minimal tricuspid regurgitation.  10. Normal pulmonic valve.  11. Normal pericardium with no pericardial effusion.  Post Bypass/ CABG x2  Post bypass normal left ventricular function. Estimated  ejection fraction 65%. No change to native valves. All  significant findings discussed with the surgeon.  Confirmed on  7/3/2018 - 15:00:41 by Sergei Walker M.D.  	  ASSESSMENT/PLAN: 	  57M w/HTN, HLD, GERD reported CP found on elective LHC to have 3vCAD now s/p CABGx2 (MARIA ELENA-LAD, LIMA-OM) on 7/3 now optimized per CTSx for discharge EP consulted for intermittent accelerated junctional rhythm. Date of Admission:    Patient is a 57y old  Male who presents with a chief complaint of "CABG x 3" (06 Jul 2018 13:16)      HISTORY OF PRESENT ILLNESS:     56 yo gentleman w/PMHx HTN, HLD, GERD reported CP found on elective LHC to have 3vCAD now s/p CABGx2 (MARIA ELENA-LAD, LIMA-OM) on 7/3 now optimized per CTSx for discharge EP consulted for intermittent accelerated junctional rhythm. He reports feeling well. Slight pleuritic chest discomfort, improved using incentive spirometry. No palpitations, nausea/vomiting, diaphoresis, lightheadedness or LE edema.         Allergies  No Known Allergies    MEDICATIONS:  aspirin enteric coated 325 milliGRAM(s) Oral daily  enoxaparin Injectable 40 milliGRAM(s) SubCutaneous daily  metoprolol succinate ER 50 milliGRAM(s) Oral daily  HYDROmorphone   Tablet 2 milliGRAM(s) Oral every 3 hours PRN  HYDROmorphone   Tablet 4 milliGRAM(s) Oral every 3 hours PRN  bisacodyl Suppository 10 milliGRAM(s) Rectal once PRN  pantoprazole    Tablet 40 milliGRAM(s) Oral before breakfast  polyethylene glycol 3350 17 Gram(s) Oral daily  atorvastatin 40 milliGRAM(s) Oral at bedtime        PAST MEDICAL & SURGICAL HISTORY:  Atherosclerotic heart disease of native coronary artery without angina pectoris  GERD (gastroesophageal reflux disease)  HLD (hyperlipidemia)  HTN (hypertension)  No significant past surgical history      FAMILY HISTORY: No significant FHx     SOCIAL HISTORY:    [ ] Non-smoker    REVIEW OF SYSTEMS:    CONSTITUTIONAL: No weakness, fevers or chills  EYES/ENT: No visual changes;  No dysphagia  NECK: No pain or stiffness  RESPIRATORY: No cough, wheezing, hemoptysis; No shortness of breath  CARDIOVASCULAR: No chest pain or palpitations; No lower extremity edema  GASTROINTESTINAL: No abdominal or epigastric pain. No nausea, vomiting, or hematemesis; No diarrhea or constipation. No melena or hematochezia.  BACK: No back pain  GENITOURINARY: No dysuria, frequency or hematuria  NEUROLOGICAL: No numbness or weakness  SKIN: No itching, burning, rashes, or lesions   All other review of systems is negative unless indicated above.    PHYSICAL EXAM:  T(C): 36.9 (07-09-18 @ 05:24), Max: 37 (07-08-18 @ 19:24)  HR: 95 (07-09-18 @ 05:24) (89 - 109)  BP: 116/75 (07-09-18 @ 05:24) (103/68 - 123/80)  RR: 18 (07-09-18 @ 05:24) (16 - 18)  SpO2: 97% (07-09-18 @ 05:24) (95% - 98%)  Wt(kg): --  I&O's Summary    08 Jul 2018 07:01  -  09 Jul 2018 07:00  --------------------------------------------------------  IN: 520 mL / OUT: 2425 mL / NET: -1905 mL        Appearance: Well appearing, pleasant, sitting up comfortably  HEENT:   Normal oral mucosa, PERRL, EOMI	  Lymphatic: No lymphadenopathy  Cardiovascular: Normal S1 S2, No JVD, No murmurs, No edema, sternal wound dressing c/d/i   Respiratory: Lungs clear to auscultation	  Psychiatry: A & O x 3, Mood & affect appropriate  Gastrointestinal:  Soft, Non-tender, + BS	  Skin: No rashes, No ecchymoses, No cyanosis	  Neurologic: Non-focal  Extremities: Normal range of motion, No clubbing, cyanosis  Vascular: Peripheral pulses palpable 2+ bilaterally        LABS:	 	    CBC Full  -  ( 09 Jul 2018 08:49 )  WBC Count : 8.4 K/uL  Hemoglobin : 12.8 g/dL  Hematocrit : 36.3 %  Platelet Count - Automated : 235 K/uL  Mean Cell Volume : 91.6 fl  Mean Cell Hemoglobin : 32.2 pg  Mean Cell Hemoglobin Concentration : 35.2 gm/dL      07-09    137  |  97  |  18  ----------------------------<  145<H>  4.4   |  29  |  1.05  07-08    139  |  97  |  20  ----------------------------<  114<H>  3.9   |  30  |  1.02    Ca    8.9      09 Jul 2018 08:49  Ca    8.9      08 Jul 2018 06:41    TELEMETRY: 	      ECG:  	  RADIOLOGY: 	  7/6 CXR- no focal consolidation     PREVIOUS DIAGNOSTIC TESTING:    [ ] Echocardiogram: < from: Intra-Operative Transesophageal Echo (07.03.18 @ 14:15) >  Dimensions:    Normal Values:  LA:     4.4    2.0 - 4.0 cm  Ao:     2.8    2.0 - 3.8 cm  SEPTUM:        0.6 - 1.2 cm  PWT:           0.6 - 1.1 cm  LVIDd:         3.0 - 5.6 cm  LVIDs:         1.8 - 4.0 cm  EF (Visual Estimate): 65 %      Post-Bypass Observations:    Post Bypass/ CABG x2  Post bypass normal left ventricular function. Estimated  ejection fraction 65%. No change to native valves. All  significant findings discussed with the surgeon.  ------------------------------------------------------------------------  Conclusions:  1. Normal mitral valve. Minimal mitral regurgitation.  2. Normal trileaflet aortic valve. No aortic valve  regurgitation seen.  3. Aortic Root: 2.8 cm.  Ascending Aorta: 2.7 cm.  4. Normal left atrium.  5. Mild concentric left ventricular hypertrophy.  6. Normal left ventricular systolic function. No segmental  wall motion abnormalities.  7. Normal right atrium.  8. Normal right ventricular size and function.  9. Normal tricuspid valve. Minimal tricuspid regurgitation.  10. Normal pulmonic valve.  11. Normal pericardium with no pericardial effusion.  Post Bypass/ CABG x2  Post bypass normal left ventricular function. Estimated  ejection fraction 65%. No change to native valves. All  significant findings discussed with the surgeon.  Confirmed on  7/3/2018 - 15:00:41 by Sergei Walker M.D.  	  ASSESSMENT/PLAN: 	  57M w/HTN, HLD, GERD reported CP found on elective LHC to have 3vCAD now s/p CABGx2 (MARIA ELENA-LAD, LIMA-OM) on 7/3 now optimized per CTSx for discharge EP consulted given concern for intermittent accelerated junctional rhythm.     On review of telemetry, SR     - Would continue Toprol, up titrating per cardiology Dr. Vega  - Telemetry   - Will d/w Dr. Perez and f/u primary team    MARTHA Lozano   [id number] Date of Admission:    Patient is a 57y old  Male who presents with a chief complaint of "CABG x 3" (06 Jul 2018 13:16)      HISTORY OF PRESENT ILLNESS:     58 yo gentleman w/PMHx HTN, HLD, GERD reported CP found on elective LHC to have 3vCAD now s/p CABGx2 (MARIA ELENA-LAD, LIMA-OM) on 7/3 now optimized per CTSx for discharge EP consulted for intermittent accelerated junctional rhythm. He reports feeling well. Slight pleuritic chest discomfort, improved using incentive spirometry. No palpitations, nausea/vomiting, diaphoresis, lightheadedness or LE edema.         Allergies  No Known Allergies    MEDICATIONS:  aspirin enteric coated 325 milliGRAM(s) Oral daily  enoxaparin Injectable 40 milliGRAM(s) SubCutaneous daily  metoprolol succinate ER 50 milliGRAM(s) Oral daily  HYDROmorphone   Tablet 2 milliGRAM(s) Oral every 3 hours PRN  HYDROmorphone   Tablet 4 milliGRAM(s) Oral every 3 hours PRN  bisacodyl Suppository 10 milliGRAM(s) Rectal once PRN  pantoprazole    Tablet 40 milliGRAM(s) Oral before breakfast  polyethylene glycol 3350 17 Gram(s) Oral daily  atorvastatin 40 milliGRAM(s) Oral at bedtime        PAST MEDICAL & SURGICAL HISTORY:  Atherosclerotic heart disease of native coronary artery without angina pectoris  GERD (gastroesophageal reflux disease)  HLD (hyperlipidemia)  HTN (hypertension)  No significant past surgical history      FAMILY HISTORY: No significant FHx     SOCIAL HISTORY:    [ ] Non-smoker    REVIEW OF SYSTEMS:    CONSTITUTIONAL: No weakness, fevers or chills  EYES/ENT: No visual changes;  No dysphagia  NECK: No pain or stiffness  RESPIRATORY: No cough, wheezing, hemoptysis; No shortness of breath  CARDIOVASCULAR: No chest pain or palpitations; No lower extremity edema  GASTROINTESTINAL: No abdominal or epigastric pain. No nausea, vomiting, or hematemesis; No diarrhea or constipation. No melena or hematochezia.  BACK: No back pain  GENITOURINARY: No dysuria, frequency or hematuria  NEUROLOGICAL: No numbness or weakness  SKIN: No itching, burning, rashes, or lesions   All other review of systems is negative unless indicated above.    PHYSICAL EXAM:  T(C): 36.9 (07-09-18 @ 05:24), Max: 37 (07-08-18 @ 19:24)  HR: 95 (07-09-18 @ 05:24) (89 - 109)  BP: 116/75 (07-09-18 @ 05:24) (103/68 - 123/80)  RR: 18 (07-09-18 @ 05:24) (16 - 18)  SpO2: 97% (07-09-18 @ 05:24) (95% - 98%)  Wt(kg): --  I&O's Summary    08 Jul 2018 07:01  -  09 Jul 2018 07:00  --------------------------------------------------------  IN: 520 mL / OUT: 2425 mL / NET: -1905 mL        Appearance: Well appearing, pleasant, sitting up comfortably  HEENT:   Normal oral mucosa, PERRL, EOMI	  Lymphatic: No lymphadenopathy  Cardiovascular: Normal S1 S2, No JVD, No murmurs, No edema, sternal wound dressing c/d/i   Respiratory: Lungs clear to auscultation	  Psychiatry: A & O x 3, Mood & affect appropriate  Gastrointestinal:  Soft, Non-tender, + BS	  Skin: No rashes, No ecchymoses, No cyanosis	  Neurologic: Non-focal  Extremities: Normal range of motion, No clubbing, cyanosis  Vascular: Peripheral pulses palpable 2+ bilaterally        LABS:	 	    CBC Full  -  ( 09 Jul 2018 08:49 )  WBC Count : 8.4 K/uL  Hemoglobin : 12.8 g/dL  Hematocrit : 36.3 %  Platelet Count - Automated : 235 K/uL  Mean Cell Volume : 91.6 fl  Mean Cell Hemoglobin : 32.2 pg  Mean Cell Hemoglobin Concentration : 35.2 gm/dL      07-09    137  |  97  |  18  ----------------------------<  145<H>  4.4   |  29  |  1.05  07-08    139  |  97  |  20  ----------------------------<  114<H>  3.9   |  30  |  1.02    Ca    8.9      09 Jul 2018 08:49  Ca    8.9      08 Jul 2018 06:41    TELEMETRY: 	      ECG:  	  RADIOLOGY: 	  7/6 CXR- no focal consolidation     PREVIOUS DIAGNOSTIC TESTING:    [ ] Echocardiogram: < from: Intra-Operative Transesophageal Echo (07.03.18 @ 14:15) >  Dimensions:    Normal Values:  LA:     4.4    2.0 - 4.0 cm  Ao:     2.8    2.0 - 3.8 cm  SEPTUM:        0.6 - 1.2 cm  PWT:           0.6 - 1.1 cm  LVIDd:         3.0 - 5.6 cm  LVIDs:         1.8 - 4.0 cm  EF (Visual Estimate): 65 %      Post-Bypass Observations:    Post Bypass/ CABG x2  Post bypass normal left ventricular function. Estimated  ejection fraction 65%. No change to native valves. All  significant findings discussed with the surgeon.  ------------------------------------------------------------------------  Conclusions:  1. Normal mitral valve. Minimal mitral regurgitation.  2. Normal trileaflet aortic valve. No aortic valve  regurgitation seen.  3. Aortic Root: 2.8 cm.  Ascending Aorta: 2.7 cm.  4. Normal left atrium.  5. Mild concentric left ventricular hypertrophy.  6. Normal left ventricular systolic function. No segmental  wall motion abnormalities.  7. Normal right atrium.  8. Normal right ventricular size and function.  9. Normal tricuspid valve. Minimal tricuspid regurgitation.  10. Normal pulmonic valve.  11. Normal pericardium with no pericardial effusion.  Post Bypass/ CABG x2  Post bypass normal left ventricular function. Estimated  ejection fraction 65%. No change to native valves. All  significant findings discussed with the surgeon.  Confirmed on  7/3/2018 - 15:00:41 by Sergei Walker M.D.  	  ASSESSMENT/PLAN: 	  57M w/HTN, HLD, GERD reported CP found on elective LHC to have 3vCAD now s/p CABGx2 (MARIA ELENA-LAD, LIMA-OM) on 7/3 now optimized per CTSx for discharge EP consulted given concern for intermittent accelerated junctional rhythm.     On review of telemetry, SR     - Would continue Toprol, up titrating per cardiology Dr. Vega  - Telemetry   - Case d/w Dr Perez and CTSx VINNY Lozano   [id number]

## 2018-07-09 NOTE — PROGRESS NOTE ADULT - SUBJECTIVE AND OBJECTIVE BOX
VITAL SIGNS    Telemetry:  acc paulo  70-80    Vital Signs Last 24 Hrs  T(C): 36.9 (18 @ 05:24), Max: 37 (18 @ 19:24)  T(F): 98.5 (18 @ 05:24), Max: 98.6 (18 @ 19:24)  HR: 95 (18 @ 05:24) (89 - 109)  BP: 116/75 (18 @ 05:24) (103/68 - 123/80)  RR: 18 (18 @ 05:24) (16 - 18)  SpO2: 97% (18 @ 05:24) (95% - 98%)           Daily Weight in k.4 (2018 08:08)        CAPILLARY BLOOD GLUCOSE                            PHYSICAL EXAM    Neurology: alert and oriented x 3, moves all extremities with no defecits  CV :  RRR  Sternal Wound :  CDI , Stable  Lungs:   CTA B/L  Abdomen: soft, nontender, nondistended, positive bowel sounds  Extremities:     trace le edema VITAL SIGNS    Telemetry:   NSR vs acc paulo  70-80    Vital Signs Last 24 Hrs  T(C): 36.9 (18 @ 05:24), Max: 37 (18 @ 19:24)  T(F): 98.5 (18 @ 05:24), Max: 98.6 (18 @ 19:24)  HR: 95 (18 @ 05:24) (89 - 109)  BP: 116/75 (18 @ 05:24) (103/68 - 123/80)  RR: 18 (18 @ 05:24) (16 - 18)  SpO2: 97% (18 @ 05:24) (95% - 98%)           Daily Weight in k.4 (2018 08:08)        CAPILLARY BLOOD GLUCOSE                            PHYSICAL EXAM    Neurology: alert and oriented x 3, moves all extremities with no defecits  CV :  RRR  Sternal Wound :  CDI , Stable  Lungs:   CTA B/L  Abdomen: soft, nontender, nondistended, positive bowel sounds  Extremities:     trace le edema

## 2018-07-10 VITALS
RESPIRATION RATE: 19 BRPM | DIASTOLIC BLOOD PRESSURE: 84 MMHG | OXYGEN SATURATION: 94 % | TEMPERATURE: 98 F | HEART RATE: 74 BPM | SYSTOLIC BLOOD PRESSURE: 121 MMHG

## 2018-07-10 LAB
ANION GAP SERPL CALC-SCNC: 10 MMOL/L — SIGNIFICANT CHANGE UP (ref 5–17)
BUN SERPL-MCNC: 18 MG/DL — SIGNIFICANT CHANGE UP (ref 7–23)
CALCIUM SERPL-MCNC: 8.6 MG/DL — SIGNIFICANT CHANGE UP (ref 8.4–10.5)
CHLORIDE SERPL-SCNC: 99 MMOL/L — SIGNIFICANT CHANGE UP (ref 96–108)
CO2 SERPL-SCNC: 29 MMOL/L — SIGNIFICANT CHANGE UP (ref 22–31)
CREAT SERPL-MCNC: 1.16 MG/DL — SIGNIFICANT CHANGE UP (ref 0.5–1.3)
GLUCOSE SERPL-MCNC: 105 MG/DL — HIGH (ref 70–99)
HCT VFR BLD CALC: 34 % — LOW (ref 39–50)
HGB BLD-MCNC: 12.3 G/DL — LOW (ref 13–17)
MCHC RBC-ENTMCNC: 33 PG — SIGNIFICANT CHANGE UP (ref 27–34)
MCHC RBC-ENTMCNC: 36.2 GM/DL — HIGH (ref 32–36)
MCV RBC AUTO: 91.4 FL — SIGNIFICANT CHANGE UP (ref 80–100)
PLATELET # BLD AUTO: 249 K/UL — SIGNIFICANT CHANGE UP (ref 150–400)
POTASSIUM SERPL-MCNC: 4.2 MMOL/L — SIGNIFICANT CHANGE UP (ref 3.5–5.3)
POTASSIUM SERPL-SCNC: 4.2 MMOL/L — SIGNIFICANT CHANGE UP (ref 3.5–5.3)
RBC # BLD: 3.73 M/UL — LOW (ref 4.2–5.8)
RBC # FLD: 11.5 % — SIGNIFICANT CHANGE UP (ref 10.3–14.5)
SODIUM SERPL-SCNC: 138 MMOL/L — SIGNIFICANT CHANGE UP (ref 135–145)
WBC # BLD: 7.5 K/UL — SIGNIFICANT CHANGE UP (ref 3.8–10.5)
WBC # FLD AUTO: 7.5 K/UL — SIGNIFICANT CHANGE UP (ref 3.8–10.5)

## 2018-07-10 PROCEDURE — 84132 ASSAY OF SERUM POTASSIUM: CPT

## 2018-07-10 PROCEDURE — 85610 PROTHROMBIN TIME: CPT

## 2018-07-10 PROCEDURE — 86891 AUTOLOGOUS BLOOD OP SALVAGE: CPT

## 2018-07-10 PROCEDURE — 80053 COMPREHEN METABOLIC PANEL: CPT

## 2018-07-10 PROCEDURE — 71045 X-RAY EXAM CHEST 1 VIEW: CPT

## 2018-07-10 PROCEDURE — 84443 ASSAY THYROID STIM HORMONE: CPT

## 2018-07-10 PROCEDURE — C1751: CPT

## 2018-07-10 PROCEDURE — P9041: CPT

## 2018-07-10 PROCEDURE — 85014 HEMATOCRIT: CPT

## 2018-07-10 PROCEDURE — 82435 ASSAY OF BLOOD CHLORIDE: CPT

## 2018-07-10 PROCEDURE — 84295 ASSAY OF SERUM SODIUM: CPT

## 2018-07-10 PROCEDURE — P9047: CPT

## 2018-07-10 PROCEDURE — 99239 HOSP IP/OBS DSCHRG MGMT >30: CPT | Mod: 24

## 2018-07-10 PROCEDURE — C1769: CPT

## 2018-07-10 PROCEDURE — 86900 BLOOD TYPING SEROLOGIC ABO: CPT

## 2018-07-10 PROCEDURE — 97116 GAIT TRAINING THERAPY: CPT

## 2018-07-10 PROCEDURE — P9045: CPT

## 2018-07-10 PROCEDURE — 82803 BLOOD GASES ANY COMBINATION: CPT

## 2018-07-10 PROCEDURE — 84484 ASSAY OF TROPONIN QUANT: CPT

## 2018-07-10 PROCEDURE — 82947 ASSAY GLUCOSE BLOOD QUANT: CPT

## 2018-07-10 PROCEDURE — C1889: CPT

## 2018-07-10 PROCEDURE — 86901 BLOOD TYPING SEROLOGIC RH(D): CPT

## 2018-07-10 PROCEDURE — 85730 THROMBOPLASTIN TIME PARTIAL: CPT

## 2018-07-10 PROCEDURE — 82330 ASSAY OF CALCIUM: CPT

## 2018-07-10 PROCEDURE — 93010 ELECTROCARDIOGRAM REPORT: CPT

## 2018-07-10 PROCEDURE — 85027 COMPLETE CBC AUTOMATED: CPT

## 2018-07-10 PROCEDURE — 82553 CREATINE MB FRACTION: CPT

## 2018-07-10 PROCEDURE — 83605 ASSAY OF LACTIC ACID: CPT

## 2018-07-10 PROCEDURE — 80048 BASIC METABOLIC PNL TOTAL CA: CPT

## 2018-07-10 PROCEDURE — 93005 ELECTROCARDIOGRAM TRACING: CPT

## 2018-07-10 PROCEDURE — 85384 FIBRINOGEN ACTIVITY: CPT

## 2018-07-10 PROCEDURE — 83735 ASSAY OF MAGNESIUM: CPT

## 2018-07-10 PROCEDURE — C1729: CPT

## 2018-07-10 PROCEDURE — 86923 COMPATIBILITY TEST ELECTRIC: CPT

## 2018-07-10 PROCEDURE — 82962 GLUCOSE BLOOD TEST: CPT

## 2018-07-10 PROCEDURE — 97161 PT EVAL LOW COMPLEX 20 MIN: CPT

## 2018-07-10 PROCEDURE — 84100 ASSAY OF PHOSPHORUS: CPT

## 2018-07-10 PROCEDURE — 82550 ASSAY OF CK (CPK): CPT

## 2018-07-10 RX ORDER — AMLODIPINE BESYLATE 2.5 MG/1
1 TABLET ORAL
Qty: 0 | Refills: 0 | COMMUNITY

## 2018-07-10 RX ORDER — METOPROLOL TARTRATE 50 MG
3 TABLET ORAL
Qty: 90 | Refills: 0 | OUTPATIENT
Start: 2018-07-10 | End: 2018-08-08

## 2018-07-10 RX ORDER — METOPROLOL TARTRATE 50 MG
1 TABLET ORAL
Qty: 0 | Refills: 0 | COMMUNITY

## 2018-07-10 RX ADMIN — Medication 100 MILLIGRAM(S): at 06:01

## 2018-07-10 RX ADMIN — Medication 100 MILLIGRAM(S): at 13:13

## 2018-07-10 RX ADMIN — Medication 75 MILLIGRAM(S): at 06:01

## 2018-07-10 RX ADMIN — SIMETHICONE 80 MILLIGRAM(S): 80 TABLET, CHEWABLE ORAL at 11:23

## 2018-07-10 RX ADMIN — PANTOPRAZOLE SODIUM 40 MILLIGRAM(S): 20 TABLET, DELAYED RELEASE ORAL at 06:01

## 2018-07-10 RX ADMIN — POLYETHYLENE GLYCOL 3350 17 GRAM(S): 17 POWDER, FOR SOLUTION ORAL at 11:20

## 2018-07-10 RX ADMIN — HYDROMORPHONE HYDROCHLORIDE 2 MILLIGRAM(S): 2 INJECTION INTRAMUSCULAR; INTRAVENOUS; SUBCUTANEOUS at 11:18

## 2018-07-10 RX ADMIN — ENOXAPARIN SODIUM 40 MILLIGRAM(S): 100 INJECTION SUBCUTANEOUS at 11:20

## 2018-07-10 RX ADMIN — Medication 325 MILLIGRAM(S): at 11:19

## 2018-07-10 RX ADMIN — HYDROMORPHONE HYDROCHLORIDE 4 MILLIGRAM(S): 2 INJECTION INTRAMUSCULAR; INTRAVENOUS; SUBCUTANEOUS at 02:39

## 2018-07-10 RX ADMIN — HYDROMORPHONE HYDROCHLORIDE 4 MILLIGRAM(S): 2 INJECTION INTRAMUSCULAR; INTRAVENOUS; SUBCUTANEOUS at 03:15

## 2018-07-10 RX ADMIN — HYDROMORPHONE HYDROCHLORIDE 2 MILLIGRAM(S): 2 INJECTION INTRAMUSCULAR; INTRAVENOUS; SUBCUTANEOUS at 10:43

## 2018-07-16 ENCOUNTER — APPOINTMENT (OUTPATIENT)
Age: 58
End: 2018-07-16
Payer: COMMERCIAL

## 2018-07-16 PROCEDURE — 99024 POSTOP FOLLOW-UP VISIT: CPT

## 2018-07-30 ENCOUNTER — RECORD ABSTRACTING (OUTPATIENT)
Age: 58
End: 2018-07-30

## 2018-07-30 PROBLEM — Z09 POSTOP CHECK: Status: ACTIVE | Noted: 2018-07-30

## 2018-07-30 RX ORDER — METOPROLOL SUCCINATE 25 MG/1
25 TABLET, EXTENDED RELEASE ORAL
Refills: 0 | Status: ACTIVE | COMMUNITY

## 2018-07-30 RX ORDER — METOPROLOL SUCCINATE 100 MG/1
100 TABLET, EXTENDED RELEASE ORAL DAILY
Qty: 90 | Refills: 0 | Status: DISCONTINUED | COMMUNITY
Start: 2017-12-14 | End: 2018-07-30

## 2018-07-30 RX ORDER — AMLODIPINE BESYLATE 5 MG/1
5 TABLET ORAL
Qty: 30 | Refills: 0 | Status: DISCONTINUED | COMMUNITY
Start: 2018-05-07 | End: 2018-07-30

## 2018-08-01 ENCOUNTER — APPOINTMENT (OUTPATIENT)
Dept: CARDIOTHORACIC SURGERY | Facility: CLINIC | Age: 58
End: 2018-08-01
Payer: COMMERCIAL

## 2018-08-01 VITALS
DIASTOLIC BLOOD PRESSURE: 80 MMHG | HEIGHT: 77 IN | SYSTOLIC BLOOD PRESSURE: 152 MMHG | HEART RATE: 58 BPM | RESPIRATION RATE: 16 BRPM | TEMPERATURE: 98.1 F | OXYGEN SATURATION: 98 % | BODY MASS INDEX: 27.3 KG/M2 | WEIGHT: 231.2 LBS

## 2018-08-01 DIAGNOSIS — Z09 ENCOUNTER FOR FOLLOW-UP EXAMINATION AFTER COMPLETED TREATMENT FOR CONDITIONS OTHER THAN MALIGNANT NEOPLASM: ICD-10-CM

## 2018-08-01 DIAGNOSIS — Z95.1 PRESENCE OF AORTOCORONARY BYPASS GRAFT: ICD-10-CM

## 2018-08-01 PROCEDURE — 99024 POSTOP FOLLOW-UP VISIT: CPT

## 2018-08-01 RX ORDER — HYDROMORPHONE HYDROCHLORIDE 2 MG/1
2 TABLET ORAL EVERY 4 HOURS
Refills: 0 | Status: DISCONTINUED | COMMUNITY
End: 2018-08-01

## 2018-08-01 RX ORDER — LORATADINE 5 MG
17 TABLET,CHEWABLE ORAL DAILY
Refills: 0 | Status: DISCONTINUED | COMMUNITY
End: 2018-08-01

## 2018-11-06 NOTE — PHYSICAL THERAPY INITIAL EVALUATION ADULT - PATIENT/FAMILY AGREES WITH PLAN
yes
Pt is full code. unable to discuss pts plan and further GOC with pts family. Pt did indicate to contact pts son. Will plan family meeting for further discussion of GOC.

## 2018-11-14 PROBLEM — K21.9 GASTRO-ESOPHAGEAL REFLUX DISEASE WITHOUT ESOPHAGITIS: Chronic | Status: ACTIVE | Noted: 2018-06-28

## 2018-11-14 PROBLEM — E78.5 HYPERLIPIDEMIA, UNSPECIFIED: Chronic | Status: ACTIVE | Noted: 2018-06-28

## 2018-11-14 PROBLEM — I25.10 ATHEROSCLEROTIC HEART DISEASE OF NATIVE CORONARY ARTERY WITHOUT ANGINA PECTORIS: Chronic | Status: ACTIVE | Noted: 2018-06-28

## 2018-11-14 PROBLEM — I10 ESSENTIAL (PRIMARY) HYPERTENSION: Chronic | Status: ACTIVE | Noted: 2018-06-28

## 2018-11-21 ENCOUNTER — APPOINTMENT (OUTPATIENT)
Dept: UROLOGY | Facility: CLINIC | Age: 58
End: 2018-11-21
Payer: COMMERCIAL

## 2018-11-21 VITALS
HEIGHT: 77 IN | WEIGHT: 235 LBS | SYSTOLIC BLOOD PRESSURE: 138 MMHG | RESPIRATION RATE: 17 BRPM | BODY MASS INDEX: 27.75 KG/M2 | TEMPERATURE: 97.9 F | HEART RATE: 52 BPM | DIASTOLIC BLOOD PRESSURE: 75 MMHG

## 2018-11-21 DIAGNOSIS — R97.20 ELEVATED PROSTATE, SPECIFIC ANTIGEN [PSA]: ICD-10-CM

## 2018-11-21 PROCEDURE — 99203 OFFICE O/P NEW LOW 30 MIN: CPT

## 2018-11-26 LAB — PSA SERPL-MCNC: 3.8 NG/ML

## 2019-01-09 ENCOUNTER — MEDICATION RENEWAL (OUTPATIENT)
Age: 59
End: 2019-01-09

## 2019-02-05 ENCOUNTER — RX RENEWAL (OUTPATIENT)
Age: 59
End: 2019-02-05

## 2019-03-29 ENCOUNTER — TRANSCRIPTION ENCOUNTER (OUTPATIENT)
Age: 59
End: 2019-03-29

## 2019-05-07 ENCOUNTER — APPOINTMENT (OUTPATIENT)
Dept: UROLOGY | Facility: CLINIC | Age: 59
End: 2019-05-07

## 2019-10-02 PROBLEM — Z60.2 PERSON LIVING ALONE: Status: ACTIVE | Noted: 2018-06-28

## 2019-10-18 ENCOUNTER — APPOINTMENT (OUTPATIENT)
Dept: ORTHOPEDIC SURGERY | Facility: CLINIC | Age: 59
End: 2019-10-18
Payer: COMMERCIAL

## 2019-10-18 DIAGNOSIS — M76.51 PATELLAR TENDINITIS, RIGHT KNEE: ICD-10-CM

## 2019-10-18 PROCEDURE — 99203 OFFICE O/P NEW LOW 30 MIN: CPT

## 2019-10-18 PROCEDURE — 73562 X-RAY EXAM OF KNEE 3: CPT | Mod: RT

## 2019-10-18 RX ORDER — AMLODIPINE BESYLATE 5 MG/1
TABLET ORAL
Refills: 0 | Status: ACTIVE | COMMUNITY

## 2019-10-18 RX ORDER — EVOLOCUMAB 140 MG/ML
140 INJECTION, SOLUTION SUBCUTANEOUS
Refills: 0 | Status: ACTIVE | COMMUNITY

## 2019-10-18 NOTE — PHYSICAL EXAM
[DP] : dorsalis pedis 2+ and symmetric bilaterally [LE] : Sensory: Intact in bilateral lower extremities [PT] : posterior tibial 2+ and symmetric bilaterally [Normal] : Alert and in no acute distress [de-identified] : Leg lengths. Prominent tibial tubercles right greater than left. Mild soft tissue swelling/tenderness right knee tibial tubercle. No appreciable joint effusion. Pain-free range of motion both knees. Both knees are stable to varus/valgus and Lachman exam testing. Able to actively extend the right knee from a seated and supine position against manual resistance. Pain right knee over the tibial tubercle with deep knee bending. [de-identified] : X-rays right knee AP weight-bearing, lateral and patellar sunrise views: Prominent tibial tubercle exostosis. No fractures

## 2019-10-18 NOTE — HISTORY OF PRESENT ILLNESS
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 He denies injury and stated the pain started when getting up out of his car. He felt a twinge in the anterior knee/ patella. He denies prior knee pain or injury, he has been having pain with stairs and getting up from seated. He took Tylenol without relief.\par \par He has CAD and is s/p double bypass last year, HTN, denies DM or smoking

## 2019-10-18 NOTE — DISCUSSION/SUMMARY
[de-identified] : Discussion had with the patient regarding condition of his knee. Recommend the rest from high-impact activities, ice, Tylenol, knee sleeve for the next 4+ weeks. Followup will be when necessary

## 2020-02-07 NOTE — H&P PST ADULT - GUM GEN PE MLT EXAM PC
Body Location Override (Optional - Billing Will Still Be Based On Selected Body Map Location If Applicable): Left cheek Mohs Case Number: [id number] [facility] Date Of Previous Biopsy (Optional): 1/22/2020 Previous Accession (Optional): [id number] Biopsy Photograph Reviewed: Yes Referring Physician (Optional): María Henson PA-C Consent Type: Consent 1 (Standard) Eye Shield Used: No Surgeon Performing Repair (Optional): Monica Carter MD Initial Size Of Lesion: 1.1 X Size Of Lesion In Cm (Optional): 0.9 Number Of Stages: 4 Primary Defect Length In Cm (Final Defect Size - Required For Flaps/Grafts): 2.2 Primary Defect Width In Cm (Final Defect Size - Required For Flaps/Grafts): 1.9 Repair Type: Complex Repair Oculoplastic Surgeon Procedure Text (A): After obtaining clear surgical margins the patient was sent to oculoplastics for surgical repair. The patient understands they will receive post-surgical care and follow-up from the referring physician's office. Oculoplastic Surgeon Procedure Text (B): After obtaining clear surgical margins the patient was sent to oculoplastics for surgical repair.  The patient understands they will receive post-surgical care and follow-up from the referring physician's office. Otolaryngologist Procedure Text (A): After obtaining clear surgical margins the patient was sent to otolaryngology for surgical repair. The patient understands they will receive post-surgical care and follow-up from the referring physician's office. Otolaryngologist Procedure Text (B): After obtaining clear surgical margins the patient was sent to otolaryngology for surgical repair.  The patient understands they will receive post-surgical care and follow-up from the referring physician's office. Plastic Surgeon Procedure Text (A): After obtaining clear surgical margins the patient was sent to plastics for surgical repair. The patient understands they will receive post-surgical care and follow-up from the referring physician's office. Plastic Surgeon Procedure Text (B): After obtaining clear surgical margins the patient was sent to plastics for surgical repair.  The patient understands they will receive post-surgical care and follow-up from the referring physician's office. Mid-Level Procedure Text (A): After obtaining clear surgical margins the patient was sent to a mid-level provider for surgical repair. The patient understands they will receive post-surgical care and follow-up from the mid-level provider. Mid-Level Procedure Text (B): After obtaining clear surgical margins the patient was sent to a mid-level provider for surgical repair.  The patient understands they will receive post-surgical care and follow-up from the mid-level provider. Provider Procedure Text (A): After obtaining clear surgical margins the defect was repaired by another provider. Asc Procedure Text (A): After obtaining clear surgical margins the patient was sent to an [facility] for surgical repair. The patient understands they will receive post-surgical care and follow-up from the [facility] physician. Asc Procedure Text (B): After obtaining clear surgical margins the patient was sent to an [facility] for surgical repair.  The patient understands they will receive post-surgical care and follow-up from the [facility] physician. Suturegard Retention Suture: 2-0 Nylon Retention Suture Bite Size: 3 mm Length To Time In Minutes Device Was In Place: 10 Simple / Intermediate / Complex Repair - Final Wound Length In Cm: 4.9 Undermining Type: Entire Wound Debridement Text: The wound edges were debrided prior to proceeding with the closure to facilitate wound healing. Helical Rim Text: The closure involved the helical rim. Vermilion Border Text: The closure involved the vermilion border. Nostril Rim Text: The closure involved the nostril rim. Retention Suture Text: Retention sutures were placed to support the closure and prevent dehiscence. Secondary Defect Length In Cm (Required For Flaps): 0 Location Indication Override (Is Already Calculated Based On Selected Body Location): Area M Area H Indication Text: Tumors in this location are included in Area H (eyelids, eyebrows, nose, lips, chin, ear, pre-auricular, post-auricular, temple, genitalia, hands, feet, ankles and areola). Tissue conservation is critical in these anatomic locations. Area M Indication Text: Tumors in this location are included in Area M (cheek, forehead, scalp, neck, jawline and pretibial skin). Mohs surgery is indicated for tumors in these anatomic locations. Area L Indication Text: Tumors in this location are included in Area L (trunk and extremities). Mohs surgery is indicated for larger tumors, or tumors with aggressive histologic features, in these anatomic locations. Tumor Debulked?: curette Depth Of Tumor Invasion (For Histology): dermis Perineural Invasion (For Histology - Be Specific If Possible): absent Stage 1: Number Of Blocks?: 1 Surgical Defect Length In Cm (Optional): 1.4 Surgical Defect Width In Cm (Optional): 1.2 Histology Selection Override (Optional- Will Default To Parent Diagnosis If N/A): Squamous Cell Carcinoma Stage 2: Additional Anesthesia Volume In Cc: 0.7 Stage 2: Additional Anesthesia Type: 1% lidocaine with 1:100,000 epinephrine and a 1:10 solution of 8.4% sodium bicarbonate Surgical Defect Length In Cm (Optional): 1.7 Medical Necessity Statement: Based on my medical judgement, Mohs surgery is the most appropriate treatment for this cancer compared to other treatments. After reviewing the pertinent pathology report, physical exam findings and the various treatment options for skin cancer treatment, standard excision and/or destruction technique methods are not clinically sufficient treatment options. To prevent the risk of compromising surgical cure and reconstruction, prompt microscopic examination of the surgical margins is necessary. Based on the given indications, maximum conservation of healthy tissue, and high cure rate, Mohs surgery is the most appropriate treatment for this cancer. Various treatment options for skin cancer treatment, including but not limited to curettage, excision with frozen sections, excision with permanent sections, photodynamic therapy, radiation therapy, topical chemotherapeutic agents, topical imiquimod, and foregoing treatment were discussed in depth with the patient. Alternatives Discussed Intro (Do Not Add Period): I discussed alternative treatments to Mohs surgery and specifically discussed the risks and benefits of Consent 1/Introductory Paragraph: The rationale and indications for Mohs surgery were explained to the patient and informed verbal and written consent was obtained. \\n\\nThe risks, benefits and alternatives to therapy were discussed in detail. Specifically, the risks of infection, permanent scarring, bleeding, hematoma formation possibly requiring a separate procedure to evacuate, prolonged wound healing, cosmetic change, incomplete removal, allergy to anesthesia, nerve injury and recurrence were addressed. \\n\\nPrior the procedure, the treatment site was clearly identified and confirmed by the patient. The treatment site was then marked with a sterile surgical marking pen and the patient confirmed that the marked site was correct. The patient voiced agreement that Mohs surgery is the best treatment option for their lesion. No guarantees were made or implied to the patient. \\n\\nThe importance of close clinical follow-up for routine full body skin examinations, as recommended by the 2020 NCCN Clinical Practice Guidelines in Oncology for any patient with a history of non-melanoma skin cancer, was reviewed with the patient. \\n\\Esthela questions were answered and I asked the patient if there were any further questions and the patient said \"No.\"\\n\\nThe patient voiced agreement with the plan and verbalized understanding. All components of Universal Protocol/PAUSE Rule completed. Informed verbal and written consent were obtained by the patient. Consent 2/Introductory Paragraph: Mohs surgery was explained to the patient and consent was obtained. The risks, benefits and alternatives to therapy were discussed in detail. Specifically, the risks of infection, scarring, bleeding, prolonged wound healing, incomplete removal, allergy to anesthesia, nerve injury and recurrence were addressed. Prior to the procedure, the treatment site was clearly identified and confirmed by the patient. All components of Universal Protocol/PAUSE Rule completed. Consent 3/Introductory Paragraph: I gave the patient a chance to ask questions they had about the procedure. Following this I explained the Mohs procedure and consent was obtained. The risks, benefits and alternatives to therapy were discussed in detail. Specifically, the risks of infection, scarring, bleeding, prolonged wound healing, incomplete removal, allergy to anesthesia, nerve injury and recurrence were addressed. Prior to the procedure, the treatment site was clearly identified and confirmed by the patient. All components of Universal Protocol/PAUSE Rule completed. Consent (Temporal Branch)/Introductory Paragraph: The rationale and indications for Mohs surgery were explained to the patient and informed verbal and written consent was obtained. \\n\\nThe risks, benefits and alternatives to therapy were discussed in detail. Specifically, the risks of damage to the temporal branch of the facial nerve, infection, permanent scarring, bleeding, hematoma formation possibly requiring a separate procedure to evacuate, prolonged wound healing, cosmetic change, incomplete removal, allergy to anesthesia, and recurrence were addressed. \\n\\nPrior the procedure, the treatment site was clearly identified and confirmed by the patient. The treatment site was then marked with a sterile surgical marking pen and the patient confirmed that the marked site was correct. The patient voiced agreement that Mohs surgery is the best treatment option for their lesion. No guarantees were made or implied to the patient. \\n\\nThe importance of close clinical follow-up for routine full body skin examinations, as recommended by the 2020 NCCN Clinical Practice Guidelines in Oncology for any patient with a history of non-melanoma skin cancer, was reviewed with the patient. \\n\\Esthela questions were answered and I asked the patient if there were any further questions and the patient said \"No.\"\\n\\nThe patient voiced agreement with the plan and verbalized understanding. All components of Universal Protocol/PAUSE Rule completed. Informed verbal and written consent were obtained by the patient. Consent (Marginal Mandibular)/Introductory Paragraph: The rationale and indications for Mohs surgery were explained to the patient and informed verbal and written consent was obtained. \\n\\nThe risks, benefits and alternatives to therapy were discussed in detail. Specifically, the risks of damage to the marginal mandibular branch of the facial nerve, infection, permanent scarring, bleeding, hematoma formation possibly requiring a separate procedure to evacuate, prolonged wound healing, cosmetic change, incomplete removal, allergy to anesthesia, and recurrence were addressed. \\n\\nPrior the procedure, the treatment site was clearly identified and confirmed by the patient. The treatment site was then marked with a sterile surgical marking pen and the patient confirmed that the marked site was correct. The patient voiced agreement that Mohs surgery is the best treatment option for their lesion. No guarantees were made or implied to the patient. \\n\\nThe importance of close clinical follow-up for routine full body skin examinations, as recommended by the 2020 NCCN Clinical Practice Guidelines in Oncology for any patient with a history of non-melanoma skin cancer, was reviewed with the patient. \\n\\Esthela questions were answered and I asked the patient if there were any further questions and the patient said \"No.\"\\n\\nThe patient voiced agreement with the plan and verbalized understanding. All components of Universal Protocol/PAUSE Rule completed. Informed verbal and written consent were obtained by the patient. Consent (Spinal Accessory)/Introductory Paragraph: The rationale and indications for Mohs surgery were explained to the patient and informed verbal and written consent was obtained. \\n\\nThe risks, benefits and alternatives to therapy were discussed in detail. Specifically, the risks of damage to the spinal accessory nerve, infection, permanent scarring, bleeding, hematoma formation possibly requiring a separate procedure to evacuate, prolonged wound healing, cosmetic change, incomplete removal, allergy to anesthesia, and recurrence were addressed. \\n\\nPrior the procedure, the treatment site was clearly identified and confirmed by the patient. The treatment site was then marked with a sterile surgical marking pen and the patient confirmed that the marked site was correct. The patient voiced agreement that Mohs surgery is the best treatment option for their lesion. No guarantees were made or implied to the patient. \\n\\nThe importance of close clinical follow-up for routine full body skin examinations, as recommended by the 2020 NCCN Clinical Practice Guidelines in Oncology for any patient with a history of non-melanoma skin cancer, was reviewed with the patient. \\n\\Esthela questions were answered and I asked the patient if there were any further questions and the patient said \"No.\"\\n\\nThe patient voiced agreement with the plan and verbalized understanding. All components of Universal Protocol/PAUSE Rule completed. Informed verbal and written consent were obtained by the patient. Consent (Near Eyelid Margin)/Introductory Paragraph: The rationale and indications for Mohs surgery were explained to the patient and informed verbal and written consent was obtained. \\n\\nThe risks, benefits and alternatives to therapy were discussed in detail. Specifically, the risks of ectropion or eyelid deformity, infection, permanent scarring, bleeding, hematoma formation possibly requiring a separate procedure to evacuate, prolonged wound healing, cosmetic change, incomplete removal, allergy to anesthesia, nerve injury and recurrence were addressed. \\n\\nPrior the procedure, the treatment site was clearly identified and confirmed by the patient. The treatment site was then marked with a sterile surgical marking pen and the patient confirmed that the marked site was correct. The patient voiced agreement that Mohs surgery is the best treatment option for their lesion. No guarantees were made or implied to the patient. \\n\\nThe importance of close clinical follow-up for routine full body skin examinations, as recommended by the 2020 NCCN Clinical Practice Guidelines in Oncology for any patient with a history of non-melanoma skin cancer, was reviewed with the patient. \\n\\Esthela questions were answered and I asked the patient if there were any further questions and the patient said \"No.\"\\n\\nThe patient voiced agreement with the plan and verbalized understanding. All components of Universal Protocol/PAUSE Rule completed. Informed verbal and written consent were obtained by the patient. Consent (Ear)/Introductory Paragraph: The rationale and indications for Mohs surgery were explained to the patient and informed verbal and written consent was obtained. \\n\\nThe risks, benefits and alternatives to therapy were discussed in detail. Specifically, the risks of ear deformity, infection, permanent scarring, bleeding, hematoma formation possibly requiring a separate procedure to evacuate, prolonged wound healing, cosmetic change, incomplete removal, allergy to anesthesia, nerve injury and recurrence were addressed. \\n\\nPrior the procedure, the treatment site was clearly identified and confirmed by the patient. The treatment site was then marked with a sterile surgical marking pen and the patient confirmed that the marked site was correct. The patient voiced agreement that Mohs surgery is the best treatment option for their lesion. No guarantees were made or implied to the patient. \\n\\nThe importance of close clinical follow-up for routine full body skin examinations, as recommended by the 2020 NCCN Clinical Practice Guidelines in Oncology for any patient with a history of non-melanoma skin cancer, was reviewed with the patient. \\n\\Esthela questions were answered and I asked the patient if there were any further questions and the patient said \"No.\"\\n\\nThe patient voiced agreement with the plan and verbalized understanding. All components of Universal Protocol/PAUSE Rule completed. Informed verbal and written consent were obtained by the patient. Consent (Nose)/Introductory Paragraph: The rationale and indications for Mohs surgery were explained to the patient and informed verbal and written consent was obtained. \\n\\nThe risks, benefits and alternatives to therapy were discussed in detail. Specifically, the risks of nasal deformity, changes in the flow of air through the nose, infection, permanent scarring, bleeding, hematoma formation possibly requiring a separate procedure to evacuate, prolonged wound healing, cosmetic change, incomplete removal, allergy to anesthesia, nerve injury and recurrence were addressed. \\n\\nPrior the procedure, the treatment site was clearly identified and confirmed by the patient. The treatment site was then marked with a sterile surgical marking pen and the patient confirmed that the marked site was correct. The patient voiced agreement that Mohs surgery is the best treatment option for their lesion. No guarantees were made or implied to the patient. \\n\\nThe importance of close clinical follow-up for routine full body skin examinations, as recommended by the 2020 NCCN Clinical Practice Guidelines in Oncology for any patient with a history of non-melanoma skin cancer, was reviewed with the patient. \\n\\Esthela questions were answered and I asked the patient if there were any further questions and the patient said \"No.\"\\n\\nThe patient voiced agreement with the plan and verbalized understanding. All components of Universal Protocol/PAUSE Rule completed. Informed verbal and written consent were obtained by the patient. Consent (Lip)/Introductory Paragraph: The rationale and indications for Mohs surgery were explained to the patient and informed verbal and written consent was obtained. \\n\\nThe risks, benefits and alternatives to therapy were discussed in detail. Specifically, the risks of lip deformity, changes in the oral aperture, infection, permanent scarring, bleeding, hematoma formation possibly requiring a separate procedure to evacuate, prolonged wound healing, cosmetic change, incomplete removal, allergy to anesthesia, nerve injury and recurrence were addressed. \\n\\nPrior the procedure, the treatment site was clearly identified and confirmed by the patient. The treatment site was then marked with a sterile surgical marking pen and the patient confirmed that the marked site was correct. The patient voiced agreement that Mohs surgery is the best treatment option for their lesion. No guarantees were made or implied to the patient. \\n\\nThe importance of close clinical follow-up for routine full body skin examinations, as recommended by the 2020 NCCN Clinical Practice Guidelines in Oncology for any patient with a history of non-melanoma skin cancer, was reviewed with the patient. \\n\\Esthela questions were answered and I asked the patient if there were any further questions and the patient said \"No.\"\\n\\nThe patient voiced agreement with the plan and verbalized understanding. All components of Universal Protocol/PAUSE Rule completed. Informed verbal and written consent were obtained by the patient. Consent (Scalp)/Introductory Paragraph: The rationale and indications for Mohs surgery were explained to the patient and informed verbal and written consent was obtained. \\n\\nThe risks, benefits and alternatives to therapy were discussed in detail. Specifically, the risks of changes in hair growth pattern secondary to repair, infection, permanent scarring, bleeding, hematoma formation possibly requiring a separate procedure to evacuate, prolonged wound healing, cosmetic change, incomplete removal, allergy to anesthesia, nerve injury and recurrence were addressed. \\n\\nPrior the procedure, the treatment site was clearly identified and confirmed by the patient. The treatment site was then marked with a sterile surgical marking pen and the patient confirmed that the marked site was correct. The patient voiced agreement that Mohs surgery is the best treatment option for their lesion. No guarantees were made or implied to the patient. \\n\\nThe importance of close clinical follow-up for routine full body skin examinations, as recommended by the 2020 NCCN Clinical Practice Guidelines in Oncology for any patient with a history of non-melanoma skin cancer, was reviewed with the patient. \\n\\Esthela questions were answered and I asked the patient if there were any further questions and the patient said \"No.\"\\n\\nThe patient voiced agreement with the plan and verbalized understanding. All components of Universal Protocol/PAUSE Rule completed. Informed verbal and written consent were obtained by the patient. Detail Level: Simple Postop Diagnosis: same Surgeon: Romero Naidu MD Anesthesia Volume In Cc: 2.5 Hemostasis: Electrocautery Estimated Blood Loss (Cc): minimal Repair Anesthesia Method: local infiltration Anesthesia Volume In Cc: 4.5 Deep Sutures: 4-0 Monocryl Epidermal Sutures: 5-0 Prolene Epidermal Closure: running Suturegard Intro: Intraoperative tissue expansion was performed, utilizing the SUTUREGARD device, in order to reduce wound tension. Suturegard Body: The suture ends were repeatedly re-tightened and re-clamped to achieve the desired tissue expansion. Donor Site Anesthesia Type: same as repair anesthesia Graft Basting Suture (Optional): 5-0 Fast Absorbing Gut Graft Donor Site Bandage (Optional-Leave Blank If You Don't Want In Note): The donor site was cleansed with sterile saline and dried. Vaseline and a pressure bandage with telfa was applied to the graft donor site. Closure 2 Information: This tab is for additional flaps and grafts, including complex repair and grafts and complex repair and flaps. You can also specify a different location for the additional defect, if the location is the same you do not need to select a new one. We will insert the automated text for the repair you select below just as we do for solitary flaps and grafts. Please note that at this time if you select a location with a different insurance zone you will need to override the ICD10 and CPT if appropriate. Closure 3 Information: This tab is for additional flaps and grafts above and beyond our usual structured repairs. Please note if you enter information here it will not currently bill and you will need to add the billing information manually. Closure 4 Information: This tab is for additional flaps and grafts above and beyond our usual structured repairs.  Please note if you enter information here it will not currently bill and you will need to add the billing information manually. Wound Care: Vaseline Dressing: pressure dressing with telfa Wound Care (No Sutures): Petrolatum Dressing (No Sutures): dry sterile dressing Suture Removal: 7 days Unna Boot Text: An Unna boot was placed to help immobilize the limb and facilitate more rapid healing. Home Suture Removal Text: Patient was provided instructions on removing sutures and will remove their sutures at home. If they have any questions or difficulties they will call the office. Post-Care Instructions: The patient tolerated the procedure well without any complications. \\n\\nThe patient was provided with detailed written post-operative wound care instructions. These instructions were verbally reviewed in great detail with the patient in the office prior to discharge. The patient was also provided with my cell phone number and asked to please contact me with any questions or concerns. \\n\\nThe patient left the office in good medical condition. Pain Refusal Text: I offered to prescribe pain medication but the patient refused to take this medication. Mauc Instructions: By selecting yes to the question below the [facility] number will be added into the note. This will be calculated automatically based on the diagnosis chosen, the size entered, the body zone selected (H,M,L) and the specific indications you chose. You will also have the option to override the Mohs AUC if you disagree with the automatically calculated number and this option is found in the Case Summary tab. Where Do You Want The Question To Include Opioid Counseling Located?: Case Summary Tab Eye Protection Verbiage: Before proceeding with the stage, a plastic scleral shield was inserted. The globe was anesthetized with a few drops of 1% lidocaine with 1:100,000 epinephrine. Then, an appropriate sized scleral shield was chosen and coated with lacrilube ointment. The shield was gently inserted and left in place for the duration of each stage. After the stage was completed, the shield was gently removed. Mohs Method Verbiage: Prior to surgery, a sterile fenestrated drape was placed over the surgical site. An excision with an approximately 45 degree bevel utilizing the standard Mohs approach was performed with a #15 scalpel blade and the specimen was harvested as a microscopically controlled layer. Surgeon/Pathologist Verbiage (Will Incorporate Name Of Surgeon From Intro If Not Blank): operated in two distinct and integrated capacities as the surgeon and pathologist. Mohs Histo Method Verbiage: The section(s) were then precisely mapped, inked for orientation, and processed in the [facility] [place] lab following the Mohs protocol and submitted for en-face frozen sections. The frozen section slides were then read by me and the results precisely mapped. Subsequent Stages Histo Method Verbiage: Prior to surgery, a sterile fenestrated drape was placed over the surgical site. An excision with an approximately 45 degree bevel utilizing the standard Mohs approach was performed with a #15 scalpel blade and the specimen was harvested as a microscopically controlled layer from all areas where tumor was visible on the previous stage. The tissue was again precisely oriented, mapped, dyed, and processed as above. Mohs Rapid Report Verbiage: The area of clinically evident tumor was marked with skin marking ink and appropriately hatched. The initial incision was made following the Mohs approach through the skin. The specimen was taken to the lab, divided into the necessary number of pieces, chromacoded and processed according to the Mohs protocol. This was repeated in successive stages until a tumor free defect was achieved. Complex Repair Preamble Text (Leave Blank If You Do Not Want): COMPLEX CLOSURE OPERATIVE NOTE DETAILS:\\n\\nThe patient was brought back into the operating room. Various closure techniques were discussed with the patient and it was determined that a Complex Closure would best preserve normal anatomic and functional relationships. \\n\\nPrior to surgery, I explained in great detail with visual aids, that by repairing the wound in a linear or curvilinear fashion, we would follow the concept of removing Burow's triangles of skin at each end of a circular defect to allow the sutured line to lie flat. I used the example that we must convert a circular defect to a \"football shape\" in order to create a sutured curved or line closure free of bumps at each end. I discussed the need to remove these standing cutaneous deformities (triangles of skin) at each end of a circular defect in order to repair the wound in such a fashion as to avoid bunching of skin at each end. I explained that we need to \"lengthen\" a wound in order to achieve this more desired and appropriate aesthetic result. \\n\\nThe risks, complications, and alternatives to a Complex Closure were discussed in detail with the patient who verbalized understanding. Risks explained include but are not limited to permanent scar, bleeding, infection, nerve injury, asymmetry, muscle weakness, cosmetic change, wound dehiscence, ecchymosis, swelling, pain, hematoma and the need for additional procedures. \\n\\nA sterile surgical marking pen was used to design the Complex Closure. Care was also taken in the design of this Complex Closure to have scar camouflage by placing scar lines in the borders of cosmetic units and within dynamic and static rhytids. In addition, strong consideration of the relaxed skin tension lines was given, when determining orientation of the repair. \\n\\nCare was taken to avoid disruption of the adjacent free margins and to preserve function. \\n\\nThe surgical site was scrubbed with the surgical preparatory solution and draped with a sterile fenestrated drape. Throughout the procedure, the patient was repeatedly instructed to let us know should any pain or discomfort occur. \\n\\nThe edges of the wound were debeveled and the wound defect was recreated. The wound was extensively undermined. Meticulous hemostasis was obtained with light electrocoagulation. Burow's triangles were removed to repair standing cone deformities; thereby, recreating the wound and its orientation to best minimize disruption of the anatomic free margins, to maintain anatomic functional relationships, and to aid in scar camouflage. \\n\\nThe advancing wound edges were then meticulously re-approximated and sewed first with deep sutures and then with superficial sutures. Intermediate Repair Preamble Text (Leave Blank If You Do Not Want): Undermining was performed with blunt dissection. Crescentic Complex Repair Preamble Text (Leave Blank If You Do Not Want): Extensive wide undermining was performed. Non-Graft Cartilage Fenestration Text: The cartilage was fenestrated with a 2mm punch biopsy to help facilitate healing. Graft Cartilage Fenestration Text: The cartilage was fenestrated with a 2mm punch biopsy to help facilitate graft survival and healing. Secondary Intention Text (Leave Blank If You Do Not Want): The patient was brought back into the operating room. All other repair options were considered including linear closure, adjacent tissue transfer, and grafting. Because of the size, depth, and location of the defect, it was decided that allowing the wound to heal by secondary intention is the most ideal reconstruction option at this time. No Repair - Repaired With Adjacent Surgical Defect Text (Leave Blank If You Do Not Want): After obtaining clear surgical margins the defect was repaired concurrently with another surgical defect which was in close approximation. Referred To Asc For Closure Text (Leave Blank If You Do Not Want): After obtaining clear surgical margins the patient was sent to an ASC for surgical repair. The patient understands they will receive post-surgical care and follow-up from the ASC physician. Advancement Flap (Single) Text: ADVANCEMENT FLAP OPERATIVE NOTE DETAILS:\\n\\nThe patient was brought back into the operating room. All other repair options were considered including secondary intention healing, linear closure, and grafting. Because of the size, depth, and location of the defect, it was decided that an adjacent tissue transfer repair is the most ideal reconstruction option at this time. \\n\\nThe risks, complications, and alternatives to an Advancement Flap were discussed in detail with the patient who verbalized understanding. Risks explained include but are not limited to permanent scar, bleeding, infection, nerve injury, asymmetry, muscle weakness, cosmetic change, wound dehiscence, ecchymosis, swelling, pain, hematoma and the need for additional procedures. \\n\\nA Flap was designed as follows:\\nType of Flap: Advancement Flap, Flap Subtype: Single Advancement Flap\\n\\nUsing a sterile surgical marker, an appropriate Advancement Flap was drawn incorporating the defect and placing the expected incisions within the relaxed skin tension lines where possible. \\n\\nThe surgical site was scrubbed with the surgical preparatory solution and draped with a sterile fenestrated drape. Throughout the procedure, the patient was repeatedly instructed to let us know should any pain or discomfort occur. \\n\\nThe defect edges were debeveled. The skin margins were undermined to an appropriate distance in all directions utilizing iris scissors. \\n\\nThe area thus outlined was incised deep to adipose tissue with a #15 scalpel blade and elevated with iris scissors. \\n\\nMeticulous hemostasis was achieved with electrocoagulation. The advancing wound edges were then meticulously re-approximated and sewed first with deep sutures and then with superficial sutures. Advancement Flap (Double) Text: ADVANCEMENT FLAP OPERATIVE NOTE DETAILS:\\n\\nThe patient was brought back into the operating room. All other repair options were considered including secondary intention healing, linear closure, and grafting. Because of the size, depth, and location of the defect, it was decided that an adjacent tissue transfer repair is the most ideal reconstruction option at this time. \\n\\nThe risks, complications, and alternatives to an Advancement Flap were discussed in detail with the patient who verbalized understanding. Risks explained include but are not limited to permanent scar, bleeding, infection, nerve injury, asymmetry, muscle weakness, cosmetic change, wound dehiscence, ecchymosis, swelling, pain, hematoma and the need for additional procedures. \\n\\nA Flap was designed as follows:\\nType of Flap: Advancement Flap, Flap Subtype: Double Advancement Flap\\n\\nUsing a sterile surgical marker, an appropriate Advancement Flap was drawn incorporating the defect and placing the expected incisions within the relaxed skin tension lines where possible. \\n\\nThe surgical site was scrubbed with the surgical preparatory solution and draped with a sterile fenestrated drape. Throughout the procedure, the patient was repeatedly instructed to let us know should any pain or discomfort occur. \\n\\nThe defect edges were debeveled. The skin margins were undermined to an appropriate distance in all directions utilizing iris scissors. \\n\\nThe area thus outlined was incised deep to adipose tissue with a #15 scalpel blade and elevated with iris scissors. \\n\\nMeticulous hemostasis was achieved with electrocoagulation. The advancing wound edges were then meticulously re-approximated and sewed first with deep sutures and then with superficial sutures. Burow's Advancement Flap Text: ADVANCEMENT FLAP OPERATIVE NOTE DETAILS:\\n\\nThe patient was brought back into the operating room. All other repair options were considered including secondary intention healing, linear closure, and grafting. Because of the size, depth, and location of the defect, it was decided that an adjacent tissue transfer repair is the most ideal reconstruction option at this time. \\n\\nThe risks, complications, and alternatives to an Advancement Flap were discussed in detail with the patient who verbalized understanding. Risks explained include but are not limited to permanent scar, bleeding, infection, nerve injury, asymmetry, muscle weakness, cosmetic change, wound dehiscence, ecchymosis, swelling, pain, hematoma and the need for additional procedures. \\n\\nA Flap was designed as follows:\\nType of Flap: Advancement Flap, Flap Subtype: Burow's Advancement Flap\\n\\nUsing a sterile surgical marker, an appropriate Advancement Flap was drawn incorporating the defect and placing the expected incisions within the relaxed skin tension lines where possible. \\n\\nThe surgical site was scrubbed with the surgical preparatory solution and draped with a sterile fenestrated drape. Throughout the procedure, the patient was repeatedly instructed to let us know should any pain or discomfort occur. \\n\\nThe defect edges were debeveled. The skin margins were undermined to an appropriate distance in all directions utilizing iris scissors. \\n\\nThe area thus outlined was incised deep to adipose tissue with a #15 scalpel blade and elevated with iris scissors. \\n\\nMeticulous hemostasis was achieved with electrocoagulation. The advancing wound edges were then meticulously re-approximated and sewed first with deep sutures and then with superficial sutures. Chonodrocutaneous Helical Advancement Flap Text: The defect edges were debeveled with a #15 scalpel blade. Given the location of the defect and the proximity to free margins a chondrocutaneous helical advancement flap was deemed most appropriate. Using a sterile surgical marker, the appropriate advancement flap was drawn incorporating the defect and placing the expected incisions within the relaxed skin tension lines where possible. The area thus outlined was incised deep to adipose tissue with a #15 scalpel blade. The skin margins were undermined to an appropriate distance in all directions utilizing iris scissors. Crescentic Advancement Flap Text: ADVANCEMENT FLAP OPERATIVE NOTE DETAILS:\\n\\nThe patient was brought back into the operating room. All other repair options were considered including secondary intention healing, linear closure, and grafting. Because of the size, depth, and location of the defect, it was decided that an adjacent tissue transfer repair is the most ideal reconstruction option at this time. \\n\\nThe risks, complications, and alternatives to an Advancement Flap were discussed in detail with the patient who verbalized understanding. Risks explained include but are not limited to permanent scar, bleeding, infection, nerve injury, asymmetry, muscle weakness, cosmetic change, wound dehiscence, ecchymosis, swelling, pain, hematoma and the need for additional procedures. \\n\\nA Flap was designed as follows:\\nType of Flap: Advancement Flap, Flap Subtype: Crescentic Advancement Flap\\n\\nUsing a sterile surgical marker, an appropriate Advancement Flap was drawn incorporating the defect and placing the expected incisions within the relaxed skin tension lines where possible. \\n\\nThe surgical site was scrubbed with the surgical preparatory solution and draped with a sterile fenestrated drape. Throughout the procedure, the patient was repeatedly instructed to let us know should any pain or discomfort occur. \\n\\nThe defect edges were debeveled. The skin margins were undermined to an appropriate distance in all directions utilizing iris scissors. \\n\\nThe area thus outlined was incised deep to adipose tissue with a #15 scalpel blade and elevated with iris scissors. \\n\\nMeticulous hemostasis was achieved with electrocoagulation. The advancing wound edges were then meticulously re-approximated and sewed first with deep sutures and then with superficial sutures. A-T Advancement Flap Text: ADVANCEMENT FLAP OPERATIVE NOTE DETAILS:\\n\\nThe patient was brought back into the operating room. All other repair options were considered including secondary intention healing, linear closure, and grafting. Because of the size, depth, and location of the defect, it was decided that an adjacent tissue transfer repair is the most ideal reconstruction option at this time. \\n\\nThe risks, complications, and alternatives to an Advancement Flap were discussed in detail with the patient who verbalized understanding. Risks explained include but are not limited to permanent scar, bleeding, infection, nerve injury, asymmetry, muscle weakness, cosmetic change, wound dehiscence, ecchymosis, swelling, pain, hematoma and the need for additional procedures. \\n\\nA Flap was designed as follows:\\nType of Flap: Advancement Flap, Flap Subtype: A-T Advancement Flap\\n\\nUsing a sterile surgical marker, an appropriate Advancement Flap was drawn incorporating the defect and placing the expected incisions within the relaxed skin tension lines where possible. \\n\\nThe surgical site was scrubbed with the surgical preparatory solution and draped with a sterile fenestrated drape. Throughout the procedure, the patient was repeatedly instructed to let us know should any pain or discomfort occur. \\n\\nThe defect edges were debeveled. The skin margins were undermined to an appropriate distance in all directions utilizing iris scissors. \\n\\nThe area thus outlined was incised deep to adipose tissue with a #15 scalpel blade and elevated with iris scissors. \\n\\nMeticulous hemostasis was achieved with electrocoagulation. The advancing wound edges were then meticulously re-approximated and sewed first with deep sutures and then with superficial sutures. O-T Advancement Flap Text: ADVANCEMENT FLAP OPERATIVE NOTE DETAILS:\\n\\nThe patient was brought back into the operating room. All other repair options were considered including secondary intention healing, linear closure, and grafting. Because of the size, depth, and location of the defect, it was decided that an adjacent tissue transfer repair is the most ideal reconstruction option at this time. \\n\\nThe risks, complications, and alternatives to an Advancement Flap were discussed in detail with the patient who verbalized understanding. Risks explained include but are not limited to permanent scar, bleeding, infection, nerve injury, asymmetry, muscle weakness, cosmetic change, wound dehiscence, ecchymosis, swelling, pain, hematoma and the need for additional procedures. \\n\\nA Flap was designed as follows:\\nType of Flap: Advancement Flap, Flap Subtype: O-T Advancement Flap\\n\\nUsing a sterile surgical marker, an appropriate Advancement Flap was drawn incorporating the defect and placing the expected incisions within the relaxed skin tension lines where possible. \\n\\nThe surgical site was scrubbed with the surgical preparatory solution and draped with a sterile fenestrated drape. Throughout the procedure, the patient was repeatedly instructed to let us know should any pain or discomfort occur. \\n\\nThe defect edges were debeveled. The skin margins were undermined to an appropriate distance in all directions utilizing iris scissors. \\n\\nThe area thus outlined was incised deep to adipose tissue with a #15 scalpel blade and elevated with iris scissors. \\n\\nMeticulous hemostasis was achieved with electrocoagulation. The advancing wound edges were then meticulously re-approximated and sewed first with deep sutures and then with superficial sutures. O-L Flap Text: ADVANCEMENT FLAP OPERATIVE NOTE DETAILS:\\n\\nThe patient was brought back into the operating room. All other repair options were considered including secondary intention healing, linear closure, and grafting. Because of the size, depth, and location of the defect, it was decided that an adjacent tissue transfer repair is the most ideal reconstruction option at this time. \\n\\nThe risks, complications, and alternatives to an Advancement Flap were discussed in detail with the patient who verbalized understanding. Risks explained include but are not limited to permanent scar, bleeding, infection, nerve injury, asymmetry, muscle weakness, cosmetic change, wound dehiscence, ecchymosis, swelling, pain, hematoma and the need for additional procedures. \\n\\nA Flap was designed as follows:\\nType of Flap: Advancement Flap, Flap Subtype: O-L Advancement Flap\\n\\nUsing a sterile surgical marker, an appropriate Advancement Flap was drawn incorporating the defect and placing the expected incisions within the relaxed skin tension lines where possible. \\n\\nThe surgical site was scrubbed with the surgical preparatory solution and draped with a sterile fenestrated drape. Throughout the procedure, the patient was repeatedly instructed to let us know should any pain or discomfort occur. \\n\\nThe defect edges were debeveled. The skin margins were undermined to an appropriate distance in all directions utilizing iris scissors. \\n\\nThe area thus outlined was incised deep to adipose tissue with a #15 scalpel blade and elevated with iris scissors. \\n\\nMeticulous hemostasis was achieved with electrocoagulation. The advancing wound edges were then meticulously re-approximated and sewed first with deep sutures and then with superficial sutures. O-Z Flap Text: ROTATION FLAP OPERATIVE NOTE DETAILS:\\n\\nThe patient was brought back into the operating room. All other repair options were considered including secondary intention healing, linear closure, and grafting. Because of the size, depth, and location of the defect, it was decided that an adjacent tissue transfer repair is the most ideal reconstruction option at this time. \\n\\nThe risks, complications, and alternatives to a Rotation Flap were discussed in detail with the patient who verbalized understanding. Risks explained include but are not limited to permanent scar, bleeding, infection, nerve injury, asymmetry, muscle weakness, cosmetic change, wound dehiscence, ecchymosis, swelling, pain, hematoma and the need for additional procedures. \\n\\nA Flap was designed as follows:\\nType of flap: Rotation Flap, Flap Subtype: O-Z Rotation Flap\\n\\nUsing a sterile surgical marker, an appropriate Rotation Flap was drawn incorporating the defect and placing the expected incisions within the relaxed skin tension lines where possible. \\n\\nThe surgical site was scrubbed with the surgical preparatory solution and draped with a sterile fenestrated drape. Throughout the procedure, the patient was repeatedly instructed to let us know should any pain or discomfort occur. \\n\\nThe defect edges were debeveled. The skin margins were undermined to an appropriate distance in all directions utilizing iris scissors. \\n\\nThe area thus outlined was incised deep to adipose tissue with a #15 scalpel blade and elevated with iris scissors. \\n\\nMeticulous hemostasis was achieved with electrocoagulation. The advancing wound edges were then meticulously re-approximated and sewed first with deep sutures and then with superficial sutures. Double O-Z Flap Text: ROTATION FLAP OPERATIVE NOTE DETAILS:\\n\\nThe patient was brought back into the operating room. All other repair options were considered including secondary intention healing, linear closure, and grafting. Because of the size, depth, and location of the defect, it was decided that an adjacent tissue transfer repair is the most ideal reconstruction option at this time. \\n\\nThe risks, complications, and alternatives to a Rotation Flap were discussed in detail with the patient who verbalized understanding. Risks explained include but are not limited to permanent scar, bleeding, infection, nerve injury, asymmetry, muscle weakness, cosmetic change, wound dehiscence, ecchymosis, swelling, pain, hematoma and the need for additional procedures. \\n\\nA Flap was designed as follows:\\nType of flap: Rotation Flap, Flap Subtype: Double O-Z Rotation Flap\\n\\nUsing a sterile surgical marker, an appropriate Rotation Flap was drawn incorporating the defect and placing the expected incisions within the relaxed skin tension lines where possible. \\n\\nThe surgical site was scrubbed with the surgical preparatory solution and draped with a sterile fenestrated drape. Throughout the procedure, the patient was repeatedly instructed to let us know should any pain or discomfort occur. \\n\\nThe defect edges were debeveled. The skin margins were undermined to an appropriate distance in all directions utilizing iris scissors. \\n\\nThe area thus outlined was incised deep to adipose tissue with a #15 scalpel blade and elevated with iris scissors. \\n\\nMeticulous hemostasis was achieved with electrocoagulation. The advancing wound edges were then meticulously re-approximated and sewed first with deep sutures and then with superficial sutures. V-Y Flap Text: ADVANCEMENT FLAP OPERATIVE NOTE DETAILS:\\n\\nThe patient was brought back into the operating room. All other repair options were considered including secondary intention healing, linear closure, and grafting. Because of the size, depth, and location of the defect, it was decided that an adjacent tissue transfer repair is the most ideal reconstruction option at this time. \\n\\nThe risks, complications, and alternatives to an Advancement Flap were discussed in detail with the patient who verbalized understanding. Risks explained include but are not limited to permanent scar, bleeding, infection, nerve injury, asymmetry, muscle weakness, cosmetic change, wound dehiscence, ecchymosis, swelling, pain, hematoma and the need for additional procedures. \\n\\nA Flap was designed as follows:\\nType of Flap: Advancement Flap, Flap Subtype: V-Y Advancement Flap\\n\\nUsing a sterile surgical marker, an appropriate Advancement Flap was drawn incorporating the defect and placing the expected incisions within the relaxed skin tension lines where possible. \\n\\nThe surgical site was scrubbed with the surgical preparatory solution and draped with a sterile fenestrated drape. Throughout the procedure, the patient was repeatedly instructed to let us know should any pain or discomfort occur. \\n\\nThe defect edges were debeveled. The skin margins were undermined to an appropriate distance in all directions utilizing iris scissors. \\n\\nThe area thus outlined was incised deep to adipose tissue with a #15 scalpel blade and elevated with iris scissors. \\n\\nMeticulous hemostasis was achieved with electrocoagulation. The advancing wound edges were then meticulously re-approximated and sewed first with deep sutures and then with superficial sutures. Advancement-Rotation Flap Text: ADVANCEMENT FLAP OPERATIVE NOTE DETAILS:\\n\\nThe patient was brought back into the operating room. All other repair options were considered including secondary intention healing, linear closure, and grafting. Because of the size, depth, and location of the defect, it was decided that an adjacent tissue transfer repair is the most ideal reconstruction option at this time. \\n\\nThe risks, complications, and alternatives to an Advancement Flap were discussed in detail with the patient who verbalized understanding. Risks explained include but are not limited to permanent scar, bleeding, infection, nerve injury, asymmetry, muscle weakness, cosmetic change, wound dehiscence, ecchymosis, swelling, pain, hematoma and the need for additional procedures. \\n\\nA Flap was designed as follows:\\nType of Flap: Advancement Flap, Flap Subtype: Advancement-Rotation Flap\\n\\nUsing a sterile surgical marker, an appropriate Advancement Flap was drawn incorporating the defect and placing the expected incisions within the relaxed skin tension lines where possible. \\n\\nThe surgical site was scrubbed with the surgical preparatory solution and draped with a sterile fenestrated drape. Throughout the procedure, the patient was repeatedly instructed to let us know should any pain or discomfort occur. \\n\\nThe defect edges were debeveled. The skin margins were undermined to an appropriate distance in all directions utilizing iris scissors. \\n\\nThe area thus outlined was incised deep to adipose tissue with a #15 scalpel blade and elevated with iris scissors. \\n\\nMeticulous hemostasis was achieved with electrocoagulation. The advancing wound edges were then meticulously re-approximated and sewed first with deep sutures and then with superficial sutures. Mercedes Flap Text: The defect edges were debeveled with a #15 scalpel blade. Given the location of the defect, shape of the defect and the proximity to free margins a Mercedes flap was deemed most appropriate. Using a sterile surgical marker, an appropriate advancement flap was drawn incorporating the defect and placing the expected incisions within the relaxed skin tension lines where possible. The area thus outlined was incised deep to adipose tissue with a #15 scalpel blade. The skin margins were undermined to an appropriate distance in all directions utilizing iris scissors. Modified Advancement Flap Text: The defect edges were debeveled with a #15 scalpel blade. Given the location of the defect, shape of the defect and the proximity to free margins a modified advancement flap was deemed most appropriate. Using a sterile surgical marker, an appropriate advancement flap was drawn incorporating the defect and placing the expected incisions within the relaxed skin tension lines where possible. The area thus outlined was incised deep to adipose tissue with a #15 scalpel blade. The skin margins were undermined to an appropriate distance in all directions utilizing iris scissors. Mucosal Advancement Flap Text: ADVANCEMENT FLAP OPERATIVE NOTE DETAILS:\\n\\nThe patient was brought back into the operating room. All other repair options were considered including secondary intention healing, linear closure, and grafting. Because of the size, depth, and location of the defect, it was decided that an adjacent tissue transfer repair is the most ideal reconstruction option at this time. \\n\\nThe risks, complications, and alternatives to an Advancement Flap were discussed in detail with the patient who verbalized understanding. Risks explained include but are not limited to permanent scar, bleeding, infection, nerve injury, asymmetry, muscle weakness, cosmetic change, wound dehiscence, ecchymosis, swelling, pain, hematoma and the need for additional procedures. \\n\\nA Flap was designed as follows:\\nType of Flap: Advancement Flap, Flap Subtype: Mucosal Advancment Flap\\n\\nUsing a sterile surgical marker, an appropriate Advancement Flap was drawn incorporating the defect and placing the expected incisions within the relaxed skin tension lines where possible. \\n\\nThe surgical site was scrubbed with the surgical preparatory solution and draped with a sterile fenestrated drape. Throughout the procedure, the patient was repeatedly instructed to let us know should any pain or discomfort occur. \\n\\nThe defect edges were debeveled. The mucosa was undermined anterior to the minor salivary glands and posterior to the orbicularis oris muscle. \\n\\nMeticulous hemostasis was achieved with electrocoagulation. The advancing wound edges were then meticulously re-approximated and sewed first with deep sutures and then with superficial sutures. Hatchet Flap Text: The defect edges were debeveled with a #15 scalpel blade. Given the location of the defect, shape of the defect and the proximity to free margins a hatchet flap was deemed most appropriate. Using a sterile surgical marker, an appropriate hatchet flap was drawn incorporating the defect and placing the expected incisions within the relaxed skin tension lines where possible. The area thus outlined was incised deep to adipose tissue with a #15 scalpel blade. The skin margins were undermined to an appropriate distance in all directions utilizing iris scissors. Rotation Flap Text: ROTATION FLAP OPERATIVE NOTE DETAILS:\\n\\nThe patient was brought back into the operating room. All other repair options were considered including secondary intention healing, linear closure, and grafting. Because of the size, depth, and location of the defect, it was decided that an adjacent tissue transfer repair is the most ideal reconstruction option at this time. \\n\\nThe risks, complications, and alternatives to a Rotation Flap were discussed in detail with the patient who verbalized understanding. Risks explained include but are not limited to permanent scar, bleeding, infection, nerve injury, asymmetry, muscle weakness, cosmetic change, wound dehiscence, ecchymosis, swelling, pain, hematoma and the need for additional procedures. \\n\\nA Flap was designed as follows: Rotation Flap\\n\\nUsing a sterile surgical marker, an appropriate Rotation Flap was drawn incorporating the defect and placing the expected incisions within the relaxed skin tension lines where possible. \\n\\nThe surgical site was scrubbed with the surgical preparatory solution and draped with a sterile fenestrated drape. Throughout the procedure, the patient was repeatedly instructed to let us know should any pain or discomfort occur. \\n\\nThe defect edges were debeveled. The skin margins were undermined to an appropriate distance in all directions utilizing iris scissors. \\n\\nThe area thus outlined was incised deep to adipose tissue with a #15 scalpel blade and elevated with iris scissors. \\n\\nMeticulous hemostasis was achieved with electrocoagulation. The advancing wound edges were then meticulously re-approximated and sewed first with deep sutures and then with superficial sutures. Spiral Flap Text: ROTATION FLAP OPERATIVE NOTE DETAILS:\\n\\nThe patient was brought back into the operating room. All other repair options were considered including secondary intention healing, linear closure, and grafting. Because of the size, depth, and location of the defect, it was decided that an adjacent tissue transfer repair is the most ideal reconstruction option at this time. \\n\\nThe risks, complications, and alternatives to a Rotation Flap were discussed in detail with the patient who verbalized understanding. Risks explained include but are not limited to permanent scar, bleeding, infection, nerve injury, asymmetry, muscle weakness, cosmetic change, wound dehiscence, ecchymosis, swelling, pain, hematoma and the need for additional procedures. \\n\\nA Flap was designed as follows:\\nType of flap: Rotation Flap, Flap Subtype: Spiral Flap\\n\\nUsing a sterile surgical marker, an appropriate Rotation Flap was drawn incorporating the defect and placing the expected incisions within the relaxed skin tension lines where possible. \\n\\nThe surgical site was scrubbed with the surgical preparatory solution and draped with a sterile fenestrated drape. Throughout the procedure, the patient was repeatedly instructed to let us know should any pain or discomfort occur. \\n\\nThe defect edges were debeveled. The skin margins were undermined to an appropriate distance in all directions utilizing iris scissors. \\n\\nThe area thus outlined was incised deep to adipose tissue with a #15 scalpel blade and elevated with iris scissors. \\n\\nMeticulous hemostasis was achieved with electrocoagulation. The advancing wound edges were then meticulously re-approximated and sewed first with deep sutures and then with superficial sutures. Star Wedge Flap Text: The defect edges were debeveled with a #15 scalpel blade. Given the location of the defect, shape of the defect and the proximity to free margins a star wedge flap was deemed most appropriate. Using a sterile surgical marker, an appropriate rotation flap was drawn incorporating the defect and placing the expected incisions within the relaxed skin tension lines where possible. The area thus outlined was incised deep to adipose tissue with a #15 scalpel blade. The skin margins were undermined to an appropriate distance in all directions utilizing iris scissors. Transposition Flap Text: TRANSPOSITION FLAP OPERATIVE NOTE DETAILS:\\n\\nThe patient was brought back into the operating room. All other repair options were considered including secondary intention healing, linear closure, and grafting. Because of the size, depth, and location of the defect, it was decided that an adjacent tissue transfer repair is the most ideal reconstruction option at this time. \\n\\nThe risks, complications, and alternatives to a Transposition Flap were discussed in detail with the patient who verbalized understanding. Risks explained include but are not limited to permanent scar, bleeding, infection, nerve injury, asymmetry, muscle weakness, cosmetic change, wound dehiscence, ecchymosis, swelling, pain, hematoma and the need for additional procedures. \\n\\nA Flap was designed as follows: Transposition Flap\\n\\nUsing a sterile surgical marker, an appropriate Transposition Flap was drawn incorporating the defect and placing the expected incisions within the relaxed skin tension lines where possible. \\n\\nThe surgical site was scrubbed with the surgical preparatory solution and draped with a sterile fenestrated drape. Throughout the procedure, the patient was repeatedly instructed to let us know should any pain or discomfort occur. \\n\\nThe defect edges were debeveled. The skin margins were undermined to an appropriate distance in all directions utilizing iris scissors. \\n\\nThe area thus outlined was incised deep to adipose tissue with a #15 scalpel blade and elevated with iris scissors. \\n\\nMeticulous hemostasis was achieved with electrocoagulation. The advancing wound edges were then meticulously re-approximated and sewed first with deep sutures and then with superficial sutures. Muscle Hinge Flap Text: MUSCLE HINGE FLAP OPERATIVE NOTE DETAILS:\\n\\nThe patient was brought back into the operating room. All other repair options were considered including secondary intention healing, linear closure, and grafting. Because of the size, depth, and location of the defect, it was decided that  a muscular hinge flap was needed prior to graft repair to replace volume and support the overlying graft. It was decided that this is most ideal reconstruction option at this time. \\n\\nThe risks, complications, and alternatives to a Muscle Hinge Flap were discussed in detail with the patient who verbalized understanding. Risks explained include but are not limited to permanent scar, bleeding, infection, nerve injury, asymmetry, muscle weakness, cosmetic change, wound dehiscence, ecchymosis, swelling, pain, hematoma and the need for additional procedures. \\n\\nA Flap was designed as follows: Muscle Hinge Flap\\n\\nUsing a sterile surgical marker, an appropriate Muscle Hinge Flap was drawn incorporating the defect and placing the expected incisions within the relaxed skin tension lines where possible. \\n\\nThe surgical site was scrubbed with the surgical preparatory solution and draped with a sterile fenestrated drape. Throughout the procedure, the patient was repeatedly instructed to let us know should any pain or discomfort occur. \\n\\nThe defect edges were debeveled. The skin margins were undermined to an appropriate distance in all directions utilizing iris scissors. \\n\\nThe area thus outlined was incised deep to adipose tissue with a #15 scalpel blade and elevated with iris scissors. \\n\\nMeticulous hemostasis was achieved with electrocoagulation. Melolabial Transposition Flap Text: The defect edges were debeveled with a #15 scalpel blade. Given the location of the defect and the proximity to free margins a melolabial flap was deemed most appropriate. Using a sterile surgical marker, an appropriate melolabial transposition flap was drawn incorporating the defect. The area thus outlined was incised deep to adipose tissue with a #15 scalpel blade. The skin margins were undermined to an appropriate distance in all directions utilizing iris scissors. Rhombic Flap Text: RHOMBIC FLAP OPERATIVE NOTE DETAILS:\\n\\nThe patient was brought back into the operating room. All other repair options were considered including secondary intention healing, linear closure, and grafting. Because of the size, depth, and location of the defect, it was decided that an adjacent tissue transfer repair is the most ideal reconstruction option at this time. \\n\\nThe risks, complications, and alternatives to a Rhombic Flap were discussed in detail with the patient who verbalized understanding. Risks explained include but are not limited to permanent scar, bleeding, infection, nerve injury, asymmetry, muscle weakness, cosmetic change, wound dehiscence, ecchymosis, swelling, pain, hematoma and the need for additional procedures. \\n\\nA Flap was designed as follows: Rhombic Flap\\n\\nUsing a sterile surgical marker, an appropriate Rhombic Flap was drawn incorporating the defect and placing the expected incisions within the relaxed skin tension lines where possible. \\n\\nThe surgical site was scrubbed with the surgical preparatory solution and draped with a sterile fenestrated drape. Throughout the procedure, the patient was repeatedly instructed to let us know should any pain or discomfort occur. \\n\\nThe defect edges were debeveled. The skin margins were undermined to an appropriate distance in all directions utilizing iris scissors. \\n\\nThe area thus outlined was incised deep to adipose tissue with a #15 scalpel blade and elevated with iris scissors. \\n\\nMeticulous hemostasis was achieved with electrocoagulation. The advancing wound edges were then meticulously re-approximated and sewed first with deep sutures and then with superficial sutures. Rhomboid Transposition Flap Text: The defect edges were debeveled with a #15 scalpel blade. Given the location of the defect and the proximity to free margins a rhomboid transposition flap was deemed most appropriate. Using a sterile surgical marker, an appropriate rhomboid flap was drawn incorporating the defect. The area thus outlined was incised deep to adipose tissue with a #15 scalpel blade. The skin margins were undermined to an appropriate distance in all directions utilizing iris scissors. Bi-Rhombic Flap Text: The defect edges were debeveled with a #15 scalpel blade. Given the location of the defect and the proximity to free margins a bi-rhombic flap was deemed most appropriate. Using a sterile surgical marker, an appropriate rhombic flap was drawn incorporating the defect. The area thus outlined was incised deep to adipose tissue with a #15 scalpel blade. The skin margins were undermined to an appropriate distance in all directions utilizing iris scissors. Helical Rim Advancement Flap Text: ADVANCEMENT FLAP OPERATIVE NOTE DETAILS:\\n\\nThe patient was brought back into the operating room. All other repair options were considered including secondary intention healing, linear closure, and grafting. Because of the size, depth, and location of the defect, it was decided that an adjacent tissue transfer repair is the most ideal reconstruction option at this time. \\n\\nThe risks, complications, and alternatives to an Advancement Flap were discussed in detail with the patient who verbalized understanding. Risks explained include but are not limited to permanent scar, bleeding, infection, nerve injury, asymmetry, muscle weakness, cosmetic change, wound dehiscence, ecchymosis, swelling, pain, hematoma and the need for additional procedures. \\n\\nA Flap was designed as follows:\\nType of flap: Advancement Flap, Flap subtype: Helical Rim Advancement Flap\\n\\nUsing a sterile surgical marker, an appropriate Advancement Flap was drawn incorporating the defect and placing the expected incisions between the helical rim and antihelix where possible. Eduardo Blunt \\n\\nThe surgical site was scrubbed with the surgical preparatory solution and draped with a sterile fenestrated drape. Throughout the procedure, the patient was repeatedly instructed to let us know should any pain or discomfort occur. \\n\\nThe defect edges were debeveled. The skin margins were undermined to an appropriate distance in all directions utilizing iris scissors. \\n\\nThe area thus outlined was incised deep to adipose tissue with a #15 scalpel blade and elevated with iris scissors. \\n\\nMeticulous hemostasis was achieved with electrocoagulation. The advancing wound edges were then meticulously re-approximated and sewed first with deep sutures and then with superficial sutures. Bilateral Helical Rim Advancement Flap Text: The defect edges were debeveled with a #15 blade scalpel. Given the location of the defect and the proximity to free margins (helical rim) a bilateral helical rim advancement flap was deemed most appropriate. Using a sterile surgical marker, the appropriate advancement flaps were drawn incorporating the defect and placing the expected incisions between the helical rim and antihelix where possible. The area thus outlined was incised through and through with a #15 scalpel blade. With a skin hook and iris scissors, the flaps were gently and sharply undermined and freed up. Ear Star Wedge Flap Text: The defect edges were debeveled with a #15 blade scalpel. Given the location of the defect and the proximity to free margins (helical rim) an ear star wedge flap was deemed most appropriate. Using a sterile surgical marker, the appropriate flap was drawn incorporating the defect and placing the expected incisions between the helical rim and antihelix where possible. The area thus outlined was incised through and through with a #15 scalpel blade. Banner Transposition Flap Text: TRANSPOSITION FLAP OPERATIVE NOTE DETAILS:\\n\\nThe patient was brought back into the operating room. All other repair options were considered including secondary intention healing, linear closure, and grafting. Because of the size, depth, and location of the defect, it was decided that an adjacent tissue transfer repair is the most ideal reconstruction option at this time. \\n\\nThe risks, complications, and alternatives to a Transposition Flap were discussed in detail with the patient who verbalized understanding. Risks explained include but are not limited to permanent scar, bleeding, infection, nerve injury, asymmetry, muscle weakness, cosmetic change, wound dehiscence, ecchymosis, swelling, pain, hematoma and the need for additional procedures. \\n\\nA Flap was designed as follows: \\nType of Flap: Transposition Flap, Flap Subtype: Banner Transposition Flap\\n\\nUsing a sterile surgical marker, an appropriate Transposition Flap was drawn incorporating the defect and placing the expected incisions within the relaxed skin tension lines where possible. \\n\\nThe surgical site was scrubbed with the surgical preparatory solution and draped with a sterile fenestrated drape. Throughout the procedure, the patient was repeatedly instructed to let us know should any pain or discomfort occur. \\n\\nThe defect edges were debeveled. The skin margins were undermined to an appropriate distance in all directions utilizing iris scissors. \\n\\nThe area thus outlined was incised deep to adipose tissue with a #15 scalpel blade and elevated with iris scissors. \\n\\nMeticulous hemostasis was achieved with electrocoagulation. The advancing wound edges were then meticulously re-approximated and sewed first with deep sutures and then with superficial sutures. Bilobed Flap Text: TRANSPOSITION FLAP OPERATIVE NOTE DETAILS:\\n\\nThe patient was brought back into the operating room. All other repair options were considered including secondary intention healing, linear closure, and grafting. Because of the size, depth, and location of the defect, it was decided that an adjacent tissue transfer repair is the most ideal reconstruction option at this time. \\n\\nThe risks, complications, and alternatives to a Transposition Flap were discussed in detail with the patient who verbalized understanding. Risks explained include but are not limited to permanent scar, bleeding, infection, nerve injury, asymmetry, muscle weakness, cosmetic change, wound dehiscence, ecchymosis, swelling, pain, hematoma and the need for additional procedures. \\n\\nA Flap was designed as follows: \\nType of Flap: Transposition Flap, Flap Subtype: Bilobed Flap\\n\\nUsing a sterile surgical marker, an appropriate Transposition Flap was drawn incorporating the defect and placing the expected incisions within the relaxed skin tension lines where possible. \\n\\nThe surgical site was scrubbed with the surgical preparatory solution and draped with a sterile fenestrated drape. Throughout the procedure, the patient was repeatedly instructed to let us know should any pain or discomfort occur. \\n\\nThe defect edges were debeveled. The skin margins were undermined to an appropriate distance in all directions utilizing iris scissors. \\n\\nThe area thus outlined was incised deep to adipose tissue with a #15 scalpel blade and elevated with iris scissors. \\n\\nMeticulous hemostasis was achieved with electrocoagulation. The advancing wound edges were then meticulously re-approximated and sewed first with deep sutures and then with superficial sutures. Bilobed Transposition Flap Text: TRANSPOSITION FLAP OPERATIVE NOTE DETAILS:\\n\\nThe patient was brought back into the operating room. All other repair options were considered including secondary intention healing, linear closure, and grafting. Because of the size, depth, and location of the defect, it was decided that an adjacent tissue transfer repair is the most ideal reconstruction option at this time.\\n\\nThe risks, complications, and alternatives to a Transposition Flap were discussed in detail with the patient who verbalized understanding. Risks explained include but are not limited to permanent scar, bleeding, infection, nerve injury, asymmetry, muscle weakness, cosmetic change, wound dehiscence, ecchymosis, swelling, pain, hematoma and the need for additional procedures.\\n\\nA Flap was designed as follows: \\nType of Flap: Transposition Flap, Flap Subtype: Bilobed Flap\\n\\nUsing a sterile surgical marker, an appropriate Transposition Flap was drawn incorporating the defect and placing the expected incisions within the relaxed skin tension lines where possible. \\n\\nThe surgical site was scrubbed with the surgical preparatory solution and draped with a sterile fenestrated drape. Throughout the procedure, the patient was repeatedly instructed to let us know should any pain or discomfort occur.\\n\\nThe defect edges were debeveled. The skin margins were undermined to an appropriate distance in all directions utilizing iris scissors. \\n\\nThe area thus outlined was incised deep to adipose tissue with a #15 scalpel blade and elevated with iris scissors. \\n\\nMeticulous hemostasis was achieved with electrocoagulation. The advancing wound edges were then meticulously re-approximated and sewed first with deep sutures and then with superficial sutures. not examined Trilobed Flap Text: TRANSPOSITION FLAP OPERATIVE NOTE DETAILS:\\n\\nThe patient was brought back into the operating room. All other repair options were considered including secondary intention healing, linear closure, and grafting. Because of the size, depth, and location of the defect, it was decided that an adjacent tissue transfer repair is the most ideal reconstruction option at this time. \\n\\nThe risks, complications, and alternatives to a Transposition Flap were discussed in detail with the patient who verbalized understanding. Risks explained include but are not limited to permanent scar, bleeding, infection, nerve injury, asymmetry, muscle weakness, cosmetic change, wound dehiscence, ecchymosis, swelling, pain, hematoma and the need for additional procedures. \\n\\nA Flap was designed as follows: \\nType of Flap: Transposition Flap, Flap Subtype: Trilobed Flap\\n\\nUsing a sterile surgical marker, an appropriate Transposition Flap was drawn incorporating the defect and placing the expected incisions within the relaxed skin tension lines where possible. \\n\\nThe surgical site was scrubbed with the surgical preparatory solution and draped with a sterile fenestrated drape. Throughout the procedure, the patient was repeatedly instructed to let us know should any pain or discomfort occur. \\n\\nThe defect edges were debeveled. The skin margins were undermined to an appropriate distance in all directions utilizing iris scissors. \\n\\nThe area thus outlined was incised deep to adipose tissue with a #15 scalpel blade and elevated with iris scissors. \\n\\nMeticulous hemostasis was achieved with electrocoagulation. The advancing wound edges were then meticulously re-approximated and sewed first with deep sutures and then with superficial sutures. Dorsal Nasal Flap Text: The defect edges were debeveled with a #15 scalpel blade. Given the location of the defect and the proximity to free margins a dorsal nasal flap was deemed most appropriate. Using a sterile surgical marker, an appropriate dorsal nasal flap was drawn around the defect. The area thus outlined was incised deep to adipose tissue with a #15 scalpel blade. The skin margins were undermined to an appropriate distance in all directions utilizing iris scissors. Island Pedicle Flap Text: The defect edges were debeveled with a #15 scalpel blade. Given the location of the defect, shape of the defect and the proximity to free margins an island pedicle advancement flap was deemed most appropriate. Using a sterile surgical marker, an appropriate advancement flap was drawn incorporating the defect, outlining the appropriate donor tissue and placing the expected incisions within the relaxed skin tension lines where possible. The area thus outlined was incised deep to adipose tissue with a #15 scalpel blade. The skin margins were undermined to an appropriate distance in all directions around the primary defect and laterally outward around the island pedicle utilizing iris scissors. There was minimal undermining beneath the pedicle flap. Island Pedicle Flap With Canthal Suspension Text: The defect edges were debeveled with a #15 scalpel blade. Given the location of the defect, shape of the defect and the proximity to free margins an island pedicle advancement flap was deemed most appropriate. Using a sterile surgical marker, an appropriate advancement flap was drawn incorporating the defect, outlining the appropriate donor tissue and placing the expected incisions within the relaxed skin tension lines where possible. The area thus outlined was incised deep to adipose tissue with a #15 scalpel blade. The skin margins were undermined to an appropriate distance in all directions around the primary defect and laterally outward around the island pedicle utilizing iris scissors. There was minimal undermining beneath the pedicle flap. A suspension suture was placed in the canthal tendon to prevent tension and prevent ectropion. Alar Island Pedicle Flap Text: The defect edges were debeveled with a #15 scalpel blade. Given the location of the defect, shape of the defect and the proximity to the alar rim an island pedicle advancement flap was deemed most appropriate. Using a sterile surgical marker, an appropriate advancement flap was drawn incorporating the defect, outlining the appropriate donor tissue and placing the expected incisions within the nasal ala running parallel to the alar rim. The area thus outlined was incised with a #15 scalpel blade. The skin margins were undermined minimally to an appropriate distance in all directions around the primary defect and laterally outward around the island pedicle utilizing iris scissors. There was minimal undermining beneath the pedicle flap. Double Island Pedicle Flap Text: The defect edges were debeveled with a #15 scalpel blade. Given the location of the defect, shape of the defect and the proximity to free margins a double island pedicle advancement flap was deemed most appropriate. Using a sterile surgical marker, an appropriate advancement flap was drawn incorporating the defect, outlining the appropriate donor tissue and placing the expected incisions within the relaxed skin tension lines where possible. The area thus outlined was incised deep to adipose tissue with a #15 scalpel blade. The skin margins were undermined to an appropriate distance in all directions around the primary defect and laterally outward around the island pedicle utilizing iris scissors. There was minimal undermining beneath the pedicle flap. Island Pedicle Flap-Requiring Vessel Identification Text: The defect edges were debeveled with a #15 scalpel blade. Given the location of the defect, shape of the defect and the proximity to free margins an island pedicle advancement flap was deemed most appropriate. Using a sterile surgical marker, an appropriate advancement flap was drawn, based on the axial vessel mentioned above, incorporating the defect, outlining the appropriate donor tissue and placing the expected incisions within the relaxed skin tension lines where possible. The area thus outlined was incised deep to adipose tissue with a #15 scalpel blade. The skin margins were undermined to an appropriate distance in all directions around the primary defect and laterally outward around the island pedicle utilizing iris scissors. There was minimal undermining beneath the pedicle flap. Keystone Flap Text: The defect edges were debeveled with a #15 scalpel blade. Given the location of the defect, shape of the defect a keystone flap was deemed most appropriate. Using a sterile surgical marker, an appropriate keystone flap was drawn incorporating the defect, outlining the appropriate donor tissue and placing the expected incisions within the relaxed skin tension lines where possible. The area thus outlined was incised deep to adipose tissue with a #15 scalpel blade. The skin margins were undermined to an appropriate distance in all directions around the primary defect and laterally outward around the flap utilizing iris scissors. O-T Plasty Text: The defect edges were debeveled with a #15 scalpel blade. Given the location of the defect, shape of the defect and the proximity to free margins an O-T plasty was deemed most appropriate. Using a sterile surgical marker, an appropriate O-T plasty was drawn incorporating the defect and placing the expected incisions within the relaxed skin tension lines where possible. The area thus outlined was incised deep to adipose tissue with a #15 scalpel blade. The skin margins were undermined to an appropriate distance in all directions utilizing iris scissors. O-Z Plasty Text: The defect edges were debeveled with a #15 scalpel blade. Given the location of the defect, shape of the defect and the proximity to free margins an O-Z plasty (double transposition flap) was deemed most appropriate. Using a sterile surgical marker, the appropriate transposition flaps were drawn incorporating the defect and placing the expected incisions within the relaxed skin tension lines where possible. The area thus outlined was incised deep to adipose tissue with a #15 scalpel blade. The skin margins were undermined to an appropriate distance in all directions utilizing iris scissors. Hemostasis was achieved with electrocautery. The flaps were then transposed into place, one clockwise and the other counterclockwise, and anchored with interrupted buried subcutaneous sutures. Double O-Z Plasty Text: The defect edges were debeveled with a #15 scalpel blade. Given the location of the defect, shape of the defect and the proximity to free margins a Double O-Z plasty (double transposition flap) was deemed most appropriate. Using a sterile surgical marker, the appropriate transposition flaps were drawn incorporating the defect and placing the expected incisions within the relaxed skin tension lines where possible. The area thus outlined was incised deep to adipose tissue with a #15 scalpel blade. The skin margins were undermined to an appropriate distance in all directions utilizing iris scissors. Hemostasis was achieved with electrocautery. The flaps were then transposed into place, one clockwise and the other counterclockwise, and anchored with interrupted buried subcutaneous sutures. S Plasty Text: Given the location and shape of the defect, and the orientation of relaxed skin tension lines, an S-plasty was deemed most appropriate for repair. Using a sterile surgical marker, the appropriate outline of the S-plasty was drawn, incorporating the defect and placing the expected incisions within the relaxed skin tension lines where possible. The area thus outlined was incised deep to adipose tissue with a #15 scalpel blade. The skin margins were undermined to an appropriate distance in all directions utilizing iris scissors. The skin flaps were advanced over the defect. The opposing margins were then approximated with interrupted buried subcutaneous sutures. V-Y Plasty Text: The defect edges were debeveled with a #15 scalpel blade. Given the location of the defect, shape of the defect and the proximity to free margins an V-Y advancement flap was deemed most appropriate. Using a sterile surgical marker, an appropriate advancement flap was drawn incorporating the defect and placing the expected incisions within the relaxed skin tension lines where possible. The area thus outlined was incised deep to adipose tissue with a #15 scalpel blade. The skin margins were undermined to an appropriate distance in all directions utilizing iris scissors. H Plasty Text: Given the location of the defect, shape of the defect and the proximity to free margins a H-plasty was deemed most appropriate for repair. Using a sterile surgical marker, the appropriate advancement arms of the H-plasty were drawn incorporating the defect and placing the expected incisions within the relaxed skin tension lines where possible. The area thus outlined was incised deep to adipose tissue with a #15 scalpel blade. The skin margins were undermined to an appropriate distance in all directions utilizing iris scissors. The opposing advancement arms were then advanced into place in opposite direction and anchored with interrupted buried subcutaneous sutures. W Plasty Text: The lesion was extirpated to the level of the fat with a #15 scalpel blade. Given the location of the defect, shape of the defect and the proximity to free margins a W-plasty was deemed most appropriate for repair. Using a sterile surgical marker, the appropriate transposition arms of the W-plasty were drawn incorporating the defect and placing the expected incisions within the relaxed skin tension lines where possible. The area thus outlined was incised deep to adipose tissue with a #15 scalpel blade. The skin margins were undermined to an appropriate distance in all directions utilizing iris scissors. The opposing transposition arms were then transposed into place in opposite direction and anchored with interrupted buried subcutaneous sutures. Z Plasty Text: The lesion was extirpated to the level of the fat with a #15 scalpel blade. Given the location of the defect, shape of the defect and the proximity to free margins a Z-plasty was deemed most appropriate for repair. Using a sterile surgical marker, the appropriate transposition arms of the Z-plasty were drawn incorporating the defect and placing the expected incisions within the relaxed skin tension lines where possible. The area thus outlined was incised deep to adipose tissue with a #15 scalpel blade. The skin margins were undermined to an appropriate distance in all directions utilizing iris scissors. The opposing transposition arms were then transposed into place in opposite direction and anchored with interrupted buried subcutaneous sutures. Cheek Interpolation Flap Text: A decision was made to reconstruct the defect utilizing an interpolation axial flap and a staged reconstruction. A telfa template was made of the defect. This telfa template was then used to outline the Cheek Interpolation flap. The donor area for the pedicle flap was then injected with anesthesia. The flap was excised through the skin and subcutaneous tissue down to the layer of the underlying musculature. The interpolation flap was carefully excised within this deep plane to maintain its blood supply. The edges of the donor site were undermined. The donor site was closed in a primary fashion. The pedicle was then rotated into position and sutured. Once the tube was sutured into place, adequate blood supply was confirmed with blanching and refill. The pedicle was then wrapped with xeroform gauze and dressed appropriately with a telfa and gauze bandage to ensure continued blood supply and protect the attached pedicle. Cheek-To-Nose Interpolation Flap Text: A decision was made to reconstruct the defect utilizing an interpolation axial flap and a staged reconstruction. A telfa template was made of the defect. This telfa template was then used to outline the Cheek-To-Nose Interpolation flap. The donor area for the pedicle flap was then injected with anesthesia. The flap was excised through the skin and subcutaneous tissue down to the layer of the underlying musculature. The interpolation flap was carefully excised within this deep plane to maintain its blood supply. The edges of the donor site were undermined. The donor site was closed in a primary fashion. The pedicle was then rotated into position and sutured. Once the tube was sutured into place, adequate blood supply was confirmed with blanching and refill. The pedicle was then wrapped with xeroform gauze and dressed appropriately with a telfa and gauze bandage to ensure continued blood supply and protect the attached pedicle. Interpolation Flap Text: A decision was made to reconstruct the defect utilizing an interpolation axial flap and a staged reconstruction. A telfa template was made of the defect. This telfa template was then used to outline the interpolation flap. The donor area for the pedicle flap was then injected with anesthesia. The flap was excised through the skin and subcutaneous tissue down to the layer of the underlying musculature. The interpolation flap was carefully excised within this deep plane to maintain its blood supply. The edges of the donor site were undermined. The donor site was closed in a primary fashion. The pedicle was then rotated into position and sutured. Once the tube was sutured into place, adequate blood supply was confirmed with blanching and refill. The pedicle was then wrapped with xeroform gauze and dressed appropriately with a telfa and gauze bandage to ensure continued blood supply and protect the attached pedicle. Melolabial Interpolation Flap Text: A decision was made to reconstruct the defect utilizing an interpolation axial flap and a staged reconstruction. A telfa template was made of the defect. This telfa template was then used to outline the melolabial interpolation flap. The donor area for the pedicle flap was then injected with anesthesia. The flap was excised through the skin and subcutaneous tissue down to the layer of the underlying musculature. The pedicle flap was carefully excised within this deep plane to maintain its blood supply. The edges of the donor site were undermined. The donor site was closed in a primary fashion. The pedicle was then rotated into position and sutured. Once the tube was sutured into place, adequate blood supply was confirmed with blanching and refill. The pedicle was then wrapped with xeroform gauze and dressed appropriately with a telfa and gauze bandage to ensure continued blood supply and protect the attached pedicle. Mastoid Interpolation Flap Text: A decision was made to reconstruct the defect utilizing an interpolation axial flap and a staged reconstruction. A telfa template was made of the defect. This telfa template was then used to outline the mastoid interpolation flap. The donor area for the pedicle flap was then injected with anesthesia. The flap was excised through the skin and subcutaneous tissue down to the layer of the underlying musculature. The pedicle flap was carefully excised within this deep plane to maintain its blood supply. The edges of the donor site were undermined. The donor site was closed in a primary fashion. The pedicle was then rotated into position and sutured. Once the tube was sutured into place, adequate blood supply was confirmed with blanching and refill. The pedicle was then wrapped with xeroform gauze and dressed appropriately with a telfa and gauze bandage to ensure continued blood supply and protect the attached pedicle. Posterior Auricular Interpolation Flap Text: A decision was made to reconstruct the defect utilizing an interpolation axial flap and a staged reconstruction. A telfa template was made of the defect. This telfa template was then used to outline the posterior auricular interpolation flap. The donor area for the pedicle flap was then injected with anesthesia. The flap was excised through the skin and subcutaneous tissue down to the layer of the underlying musculature. The pedicle flap was carefully excised within this deep plane to maintain its blood supply. The edges of the donor site were undermined. The donor site was closed in a primary fashion. The pedicle was then rotated into position and sutured. Once the tube was sutured into place, adequate blood supply was confirmed with blanching and refill. The pedicle was then wrapped with xeroform gauze and dressed appropriately with a telfa and gauze bandage to ensure continued blood supply and protect the attached pedicle. Paramedian Forehead Flap Text: A decision was made to reconstruct the defect utilizing an interpolation axial flap and a staged reconstruction. A telfa template was made of the defect. This telfa template was then used to outline the paramedian forehead pedicle flap. The donor area for the pedicle flap was then injected with anesthesia. The flap was excised through the skin and subcutaneous tissue down to the layer of the underlying musculature. The pedicle flap was carefully excised within this deep plane to maintain its blood supply. The edges of the donor site were undermined. The donor site was closed in a primary fashion. The pedicle was then rotated into position and sutured. Once the tube was sutured into place, adequate blood supply was confirmed with blanching and refill. The pedicle was then wrapped with xeroform gauze and dressed appropriately with a telfa and gauze bandage to ensure continued blood supply and protect the attached pedicle. Cheiloplasty (Less Than 50%) Text: A decision was made to reconstruct the defect with a  cheiloplasty. The defect was undermined extensively. Additional obicularis oris muscle was excised with a 15 blade scalpel. The defect was converted into a full thickness wedge, of less than 50% of the vertical height of the lip, to facilite a better cosmetic result. Small vessels were then tied off with 5-0 monocyrl. The obicularis oris, superficial fascia, adipose and dermis were then reapproximated. After the deeper layers were approximated the epidermis was reapproximated with particular care given to realign the vermilion border. Cheiloplasty (Complex) Text: A decision was made to reconstruct the defect with a  cheiloplasty. The defect was undermined extensively. Additional obicularis oris muscle was excised with a 15 blade scalpel. The defect was converted into a full thickness wedge to facilite a better cosmetic result. Small vessels were then tied off with 5-0 monocyrl. The obicularis oris, superficial fascia, adipose and dermis were then reapproximated. After the deeper layers were approximated the epidermis was reapproximated with particular care given to realign the vermilion border. Ear Wedge Repair Text: A wedge excision was completed by carrying down an excision through the full thickness of the ear and cartilage with an inward facing Burow's triangle. The wound was then closed in a layered fashion. Full Thickness Lip Wedge Repair (Flap) Text: Given the location of the defect and the proximity to free margins a full thickness wedge repair was deemed most appropriate. Using a sterile surgical marker, the appropriate repair was drawn incorporating the defect and placing the expected incisions perpendicular to the vermilion border. The vermilion border was also meticulously outlined to ensure appropriate reapproximation during the repair. The area thus outlined was incised through and through with a #15 scalpel blade. The muscularis and dermis were reaproximated with deep sutures following hemostasis. Care was taken to realign the vermilion border before proceeding with the superficial closure. Once the vermilion was realigned the superfical and mucosal closure was finished. Ftsg Text: FULL THICKNESS SKIN GRAFT OPERATIVE NOTE DETAILS:\\n\\nThe patient was brought back into the operating room. All other repair options were considered including secondary intention healing, linear closure, and adjacent tissue transfer. Because of the size, depth, and location of the defect, it was decided that a full thickness skin graft was the best reconstruction option at this time. \\n\\nThe size of the donor skin to be harvested was carefully measured. Using a sterile surgical marker, the primary defect shape was transferred to the donor site. The surgical site was scrubbed with the surgical preparatory solution and draped with a sterile fenestrated drape. Throughout the procedure, the patient was repeatedly instructed to let us know should any pain or discomfort occur. \\n\\nThe area thus outlined was incised deep to adipose tissue with a #15 scalpel blade. An ellipse of tissue was harvested. Wound edges were widely undermined. Meticulous hemostasis was achieved with electrocoagulation. The secondary defect was then meticulously re-approximated and sewed first with deep sutures and then with superficial sutures. \\n\\nThe harvested graft was then trimmed of adipose tissue until only dermis and epidermis was left. The skin graft was then placed in the primary defect and oriented appropriately. Split-Thickness Skin Graft Text: The defect edges were debeveled with a #15 scalpel blade. Given the location of the defect, shape of the defect and the proximity to free margins a split thickness skin graft was deemed most appropriate. Using a sterile surgical marker, the primary defect shape was transferred to the donor site. The split thickness graft was then harvested. The skin graft was then placed in the primary defect and oriented appropriately. Cartilage Graft Text: CARTILAGE GRAFT OPERATIVE NOTE DETAILS:\\n\\nThe patient was brought back into the operating room. All other repair options were considered including secondary intention healing, linear closure, and adjacent tissue transfer. Because of the size, depth, and location of the defect, it was decided that a cartilage strut would need to be utilized to provide structural support to the surgical defect. This was the best reconstruction option at this time. \\n\\nThe size of the donor cartilage to be harvested was carefully measured and outlined with a sterile surgical marker. The surgical site was scrubbed with the surgical preparatory solution and draped with a sterile fenestrated drape. Throughout the procedure, the patient was repeatedly instructed to let us know should any pain or discomfort occur. \\n\\nAn ellipse of overlying skin was removed and a cartilage strut was harvested. Meticulous hemostasis was achieved with electrocoagulation. Repair of the donor site was accomplished with accomplished with superficial sutures. \\n\\nThe cartilage graft was then placed within the defect within a pocket created for each end. It was \\nstabilized with several simple interrupted sutures with 5-0 Monocryl. The wound was left open for the next procedure. Composite Graft Text: The defect edges were debeveled with a #15 scalpel blade. Given the location of the defect, shape of the defect, the proximity to free margins and the fact the defect was full thickness a composite graft was deemed most appropriate. The defect was outline and then transferred to the donor site. A full thickness graft was then excised from the donor site. The graft was then placed in the primary defect, oriented appropriately and then sutured into place. The secondary defect was then repaired using a primary closure. Epidermal Autograft Text: The defect edges were debeveled with a #15 scalpel blade. Given the location of the defect, shape of the defect and the proximity to free margins an epidermal autograft was deemed most appropriate. Using a sterile surgical marker, the primary defect shape was transferred to the donor site. The epidermal graft was then harvested. The skin graft was then placed in the primary defect and oriented appropriately. Dermal Autograft Text: The defect edges were debeveled with a #15 scalpel blade. Given the location of the defect, shape of the defect and the proximity to free margins a dermal autograft was deemed most appropriate. Using a sterile surgical marker, the primary defect shape was transferred to the donor site. The area thus outlined was incised deep to adipose tissue with a #15 scalpel blade. The harvested graft was then trimmed of adipose and epidermal tissue until only dermis was left. The skin graft was then placed in the primary defect and oriented appropriately. Skin Substitute Text: The defect edges were debeveled with a #15 scalpel blade. Given the location of the defect, shape of the defect and the proximity to free margins a skin substitute graft was deemed most appropriate. The graft material was trimmed to fit the size of the defect. The graft was then placed in the primary defect and oriented appropriately. Tissue Cultured Epidermal Autograft Text: The defect edges were debeveled with a #15 scalpel blade. Given the location of the defect, shape of the defect and the proximity to free margins a tissue cultured epidermal autograft was deemed most appropriate. The graft was then trimmed to fit the size of the defect. The graft was then placed in the primary defect and oriented appropriately. Xenograft Text: The defect edges were debeveled with a #15 scalpel blade. Given the location of the defect, shape of the defect and the proximity to free margins a xenograft was deemed most appropriate. The graft was then trimmed to fit the size of the defect. The graft was then placed in the primary defect and oriented appropriately. Purse String (Simple) Text: Given the location of the defect and the characteristics of the surrounding skin a pursestring closure was deemed most appropriate. Undermining was performed circumfirentially around the surgical defect. A purstring suture was then placed and tightened. Purse String (Intermediate) Text: Given the location of the defect and the characteristics of the surrounding skin a pursestring intermediate closure was deemed most appropriate. Undermining was performed circumfirentially around the surgical defect. A purstring suture was then placed and tightened. Partial Purse String (Simple) Text: Given the location of the defect and the characteristics of the surrounding skin a simple purse string closure was deemed most appropriate. Undermining was performed circumfirentially around the surgical defect. A purse string suture was then placed and tightened. Wound tension only allowed a partial closure of the circular defect. Partial Purse String (Intermediate) Text: Given the location of the defect and the characteristics of the surrounding skin an intermediate purse string closure was deemed most appropriate. Undermining was performed circumfirentially around the surgical defect. A purse string suture was then placed and tightened. Wound tension only allowed a partial closure of the circular defect. Localized Dermabrasion Text: The patient was draped in routine manner. Localized dermabrasion using 3 x 17 mm wire brush was performed in routine manner to papillary dermis. This spot dermabrasion is being performed to complete skin cancer reconstruction. It also will eliminate the other sun damaged precancerous cells that are known to be part of the regional effect of a lifetime's worth of sun exposure. This localized dermabrasion is therapeutic and should not be considered cosmetic in any regard. Tarsorrhaphy Text: A tarsorrhaphy was performed using Frost sutures. Complex Repair And Flap Additional Text (Will Appearing After The Standard Complex Repair Text): The complex repair was not sufficient to completely close the primary defect. The remaining additional defect was repaired with the flap mentioned below. Complex Repair And Graft Additional Text (Will Appearing After The Standard Complex Repair Text): The complex repair was not sufficient to completely close the primary defect. The remaining additional defect was repaired with the graft mentioned below. Manual Repair Warning Statement: We plan on removing the manually selected variable below in favor of our much easier automatic structured text blocks found in the previous tab. We decided to do this to help make the flow better and give you the full power of structured data. Manual selection is never going to be ideal in our platform and I would encourage you to avoid using manual selection from this point on, especially since I will be sunsetting this feature. It is important that you do one of two things with the customized text below. First, you can save all of the text in a word file so you can have it for future reference. Second, transfer the text to the appropriate area in the Library tab. Lastly, if there is a flap or graft type which we do not have you need to let us know right away so I can add it in before the variable is hidden. No need to panic, we plan to give you roughly 6 months to make the change. Same Histology In Subsequent Stages Text: The pattern and morphology of the tumor is as described in the first stage. No Residual Tumor Seen Histology Text: There were no malignant cells seen in the sections examined. Inflammation Suggestive Of Cancer Camouflage Histology Text: There was a dense lymphocytic infiltrate which prevented adequate histologic evaluation of adjacent structures. Incidental Superficial Basal Cell Carcinoma Histology Text: There are nests of atypical basophilic cells present right below or budding off the epidermis with skip areas. Stromal changes and retraction artifacts are noted, consistent with a superficial basal cell carcinoma. Incidental Squamous Cell Carcinoma In Situ Histology Text: There is full-thickness keratinocytic atypia within the epidermis consistent with squamous cell carcinoma in situ. Incidental Actinic Keratosis Histology Text: There is atypia of the keratinocytes in the basal layer of the epidermis, consistent with an actinic keratosis. Bcc Histology Text: Beneath an irregular epidermis, there are small nodular masses of basaloid neoplastic cells with nuclear pleomorphism attached to the basal layer and surrounded by a loose fibrous stroma and mild inflammation in the dermis. The findings are typical for a basal cell carcinoma. Bcc Infiltrative Histology Text: There are nests of atypical basophilic cells present within the fibrotic dermis displaying an infiltrative pattern of invasion. Bcc Micronodular Histology Text: There are nests of atypical basophilic cells present within the fibrotic dermis displaying a micronodular pattern of invasion. Bcc  Morpheaform/Sclerosing Histology Text: There are uniform nodular masses of basaloid neoplastic cells with scanty cytoplasm and peripheral palisading with a loose fibrous stroma.  The appearance is typical for a morpheaform basal cell carcinoma Bcc  Nodular Histology Text: There are nests of basophilic cells present as nodules within the dermis. Stromal changes and retraction artifacts are noted. These findings are consistent with a nodular basal cell carcinoma. Bcc Superficial Histology Text: There are nests of atypical basophilic cells present right below or budding off the epidermis with skip areas. Stromal changes and retraction artifacts are noted. The findings are consistent with a superficial basal cell carcinoma. Mixed Nodular And Infiltrative Bcc Histology Text: There are irregular nests of pleomorphic, hyperchromatic basaloid cells with peripheral palisading infiltrate the tissue in a diffuse fashion in association with sclerotic stroma. Scc Histology Text: There are islands of atypical squamous cells invading the dermis in addition to full thickness keratinocytic atypia within the epidermis. The findings are consistent with squamous cell carcinoma. Scc Poorly Differentiated Histology Text: There are strands and nests of pleomorphic cells present in an infiltrative pattern. Mitotic activity is brisk. There is vascular proliferation and acute and chronic inflammation in the dermis. The findings are those of a poorly differentiated squamous cell carcinoma. Scc Ka Subtype Histology Text: There is a cup-shaped exoendophytic epithelial neoplasm characterized by proliferations of keratinocytes with abundant eosinophilic glossy cytoplasm and nuclei with open chromatin in the papillary and upper reticular dermis. Multiple inclusions containing elastic material are seen within the cytoplasm. The features are of squamous cell carcinoma, keratoacanthoma type. Afx Histology Text: there is a poorly differentiated spindled cell neoplasm composed of fascicles of atypical spindled and epithelioid cells, infiltrating and replacing papillary and reticular dermis. Mitotic activity is brisk, including atypical forms. The lesion is present on a sun-damaged skin. This pattern supports the diagnosis of atypical fibrous xanthoma. Melanoma In Situ Histology Text: On a sun-damaged skin, there is a broad and asymmetrical proliferation of enlarged and atypical melanocytes present continuously as single cells and in small irregular coalescing nests along the dermoepidermal junction. The melanocytes extend into the superficial portions of epithelial structures of adnexa. Pagetoid spread of melanocytes is appreciated. Occasional mitotic figures and individually necrotic melanocytes are also noted. In the underlying papillary dermis, there is mild lymphocytic inflammatory infiltrate with prominent dermal fibroplasia and melanophages. Information: Selecting Yes will display possible errors in your note based on the variables you have selected. This validation is only offered as a suggestion for you. PLEASE NOTE THAT THE VALIDATION TEXT WILL BE REMOVED WHEN YOU FINALIZE YOUR NOTE. IF YOU WANT TO FAX A PRELIMINARY NOTE YOU WILL NEED TO TOGGLE THIS TO 'NO' IF YOU DO NOT WANT IT IN YOUR FAXED NOTE.

## 2020-04-10 ENCOUNTER — APPOINTMENT (OUTPATIENT)
Dept: UROLOGY | Facility: CLINIC | Age: 60
End: 2020-04-10

## 2020-07-21 ENCOUNTER — APPOINTMENT (OUTPATIENT)
Dept: UROLOGY | Facility: CLINIC | Age: 60
End: 2020-07-21
Payer: COMMERCIAL

## 2020-07-21 VITALS
DIASTOLIC BLOOD PRESSURE: 75 MMHG | BODY MASS INDEX: 27.75 KG/M2 | HEIGHT: 77 IN | HEART RATE: 46 BPM | RESPIRATION RATE: 17 BRPM | SYSTOLIC BLOOD PRESSURE: 161 MMHG | WEIGHT: 235 LBS

## 2020-07-21 VITALS — TEMPERATURE: 98.5 F

## 2020-07-21 PROCEDURE — 99214 OFFICE O/P EST MOD 30 MIN: CPT

## 2020-07-21 NOTE — HISTORY OF PRESENT ILLNESS
[FreeTextEntry1] : Pt is a 58 yo male with history of elevated PSA.Previously 5.2 -> 3.8 on repeat November 2018. Patient has been feeling well since. No hematuria. Nocturia 2 times per night but no desire for medication. \par Also, notes Peyronie's but able to penetrate.

## 2020-07-21 NOTE — ASSESSMENT
[FreeTextEntry1] : Pt is a 60 yo male with elevated PSA. \par - Repeat PSA today.\par - Follow up in 1 year after results.

## 2020-07-21 NOTE — PHYSICAL EXAM
[General Appearance - Well Developed] : well developed [General Appearance - Well Nourished] : well nourished [Normal Appearance] : normal appearance [Well Groomed] : well groomed [General Appearance - In No Acute Distress] : no acute distress [Abdomen Soft] : soft [Abdomen Tenderness] : non-tender [Costovertebral Angle Tenderness] : no ~M costovertebral angle tenderness [Urethral Meatus] : meatus normal [Urinary Bladder Findings] : the bladder was normal on palpation [Scrotum] : the scrotum was normal [Prostate Tenderness] : the prostate was not tender [No Prostate Nodules] : no prostate nodules [Prostate Size ___ gm] : prostate size [unfilled] gm [Edema] : no peripheral edema [] : no respiratory distress [Respiration, Rhythm And Depth] : normal respiratory rhythm and effort [Exaggerated Use Of Accessory Muscles For Inspiration] : no accessory muscle use [Oriented To Time, Place, And Person] : oriented to person, place, and time [Affect] : the affect was normal [Mood] : the mood was normal [Not Anxious] : not anxious [Normal Station and Gait] : the gait and station were normal for the patient's age [No Focal Deficits] : no focal deficits [No Palpable Adenopathy] : no palpable adenopathy

## 2020-07-22 LAB — PSA SERPL-MCNC: 2.14 NG/ML

## 2020-07-27 DIAGNOSIS — N40.1 BENIGN PROSTATIC HYPERPLASIA WITH LOWER URINARY TRACT SYMPMS: ICD-10-CM

## 2020-07-27 DIAGNOSIS — N13.8 BENIGN PROSTATIC HYPERPLASIA WITH LOWER URINARY TRACT SYMPMS: ICD-10-CM

## 2021-04-26 ENCOUNTER — NON-APPOINTMENT (OUTPATIENT)
Age: 61
End: 2021-04-26

## 2021-04-29 ENCOUNTER — APPOINTMENT (OUTPATIENT)
Dept: UROLOGY | Facility: CLINIC | Age: 61
End: 2021-04-29
Payer: COMMERCIAL

## 2021-04-29 VITALS
SYSTOLIC BLOOD PRESSURE: 138 MMHG | OXYGEN SATURATION: 99 % | RESPIRATION RATE: 16 BRPM | HEART RATE: 53 BPM | BODY MASS INDEX: 26.57 KG/M2 | DIASTOLIC BLOOD PRESSURE: 76 MMHG | HEIGHT: 77 IN | WEIGHT: 225 LBS | TEMPERATURE: 97.1 F

## 2021-04-29 DIAGNOSIS — Z87.898 PERSONAL HISTORY OF OTHER SPECIFIED CONDITIONS: ICD-10-CM

## 2021-04-29 DIAGNOSIS — I25.10 ATHEROSCLEROTIC HEART DISEASE OF NATIVE CORONARY ARTERY W/OUT ANGINA PECTORIS: ICD-10-CM

## 2021-04-29 DIAGNOSIS — R37 SEXUAL DYSFUNCTION, UNSPECIFIED: ICD-10-CM

## 2021-04-29 PROCEDURE — 99214 OFFICE O/P EST MOD 30 MIN: CPT

## 2021-04-29 PROCEDURE — 99072 ADDL SUPL MATRL&STAF TM PHE: CPT

## 2021-04-29 RX ORDER — TAMSULOSIN HYDROCHLORIDE 0.4 MG/1
0.4 CAPSULE ORAL
Qty: 90 | Refills: 3 | Status: DISCONTINUED | COMMUNITY
Start: 2019-01-09 | End: 2021-04-29

## 2021-04-29 RX ORDER — EZETIMIBE 10 MG/1
10 TABLET ORAL
Qty: 30 | Refills: 0 | Status: DISCONTINUED | COMMUNITY
Start: 2017-06-27 | End: 2021-04-29

## 2021-04-29 RX ORDER — OMEPRAZOLE 40 MG/1
40 CAPSULE, DELAYED RELEASE ORAL
Qty: 30 | Refills: 0 | Status: DISCONTINUED | COMMUNITY
Start: 2018-01-22 | End: 2021-04-29

## 2021-04-29 RX ORDER — AMLODIPINE BESYLATE 10 MG/1
10 TABLET ORAL
Refills: 0 | Status: ACTIVE | COMMUNITY

## 2021-04-29 RX ORDER — TAMSULOSIN HYDROCHLORIDE 0.4 MG/1
0.4 CAPSULE ORAL
Qty: 30 | Refills: 10 | Status: DISCONTINUED | COMMUNITY
Start: 2020-07-27 | End: 2021-04-29

## 2021-04-29 NOTE — REVIEW OF SYSTEMS
[Poor quality erections] : Poor quality erections [Negative] : Heme/Lymph [FreeTextEntry1] : Interested in treatment/evaluation of sexual problems

## 2021-04-29 NOTE — HISTORY OF PRESENT ILLNESS
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\par  x 6 years\par complaining of penile curvature started in January 2020\par no change in curvature\par currently "hit or miss" in terms of ability to maintain \par able to maintain for 30 minutes - 1 hour\par during foreplay able to maintain; loses with attempted sexual intercourse\par partner since January 2021\par had a serious partner after divorce with no sexual difficulty\par then had another partner with intermittent issues\par with current partner more frequent; understanding; partner 55\par CABG  (x2) July 2018\par Has taken sildenafil and tadalafil  x 6 years\par \par  [Urinary Incontinence] : no urinary incontinence [Erectile Dysfunction] : no Erectile Dysfunction

## 2021-04-29 NOTE — PHYSICAL EXAM
[Exaggerated Use Of Accessory Muscles For Inspiration] : no accessory muscle use [Abdomen Soft] : soft [Abdomen Tenderness] : non-tender [Costovertebral Angle Tenderness] : no ~M costovertebral angle tenderness [Urethral Meatus] : meatus normal [Penis Abnormality] : normal uncircumcised penis [Epididymis] : the epididymides were normal [Testes Tenderness] : no tenderness of the testes [Testes Mass (___cm)] : there were no testicular masses [FreeTextEntry1] : + 2 edema; median sternotomy

## 2021-04-29 NOTE — ASSESSMENT
[FreeTextEntry1] : Mr. Holguin is a 60-year-old retired  who works as a personal .  He is .  He is complaining of sexual dysfunction.  He has a long history of utilizing phosphodiesterase inhibitors.  His past medical history is significant for coronary artery disease and a CABG (two-vessel).  He is currently on amlodipine for hypertension.\par \par His complaint is the inability to maintain an erection.  He has a new sexual partner for the last 6 months.  Prior to this he occasionally would experience sexual dysfunction with his preceding partner.  After his divorce he had 1 sexual partner for prolonged period of time and would occasionally have difficulty maintaining erection for intercourse.  Is important to note that Mr. Holguin states that he has an excellent erection lasting 30 minutes to 1 hour during foreplay.  He loses this erection upon attempted intromission.  Without attempted intercourse he does not lose his erection.\par \par We discussed what appears to be "situational erectile dysfunction.  We discussed behavior modification techniques that may be of help.  We also discussed the timing of the utilization of phosphodiesterase inhibitors.  We discussed the avoidance of alcohol.\par \par \par DIscussed  treatment options including PD 5-I, Intracavernosal therapy, erection vacuum device.\par Instructional materials provided to patient.\par Risks and side effects of each discussed.\par Warned re prolonged erection or prolonged application of constriction band.\par \par Informational materials given to patient.\par Patient was directed to [facility] material regarding condition, management and options\par \par \par We plan to proceed with:\par 1.  PSA as he had previously had an elevated PSA which was found to be normal subsequently\par 2.  Endocrine evaluation\par 3.  Behavior modification\par 4.  Follow-up with cardiologist regarding his peripheral edema\par 5 follow-up in 6 weeks to assess his progress.\par

## 2021-05-01 ENCOUNTER — TRANSCRIPTION ENCOUNTER (OUTPATIENT)
Age: 61
End: 2021-05-01

## 2021-05-01 LAB
PROLACTIN SERPL-MCNC: 9.7 NG/ML
PSA SERPL-MCNC: 2.07 NG/ML
TESTOST SERPL-MCNC: 473 NG/DL

## 2021-05-03 ENCOUNTER — TRANSCRIPTION ENCOUNTER (OUTPATIENT)
Age: 61
End: 2021-05-03

## 2021-05-21 ENCOUNTER — RX RENEWAL (OUTPATIENT)
Age: 61
End: 2021-05-21

## 2021-06-10 ENCOUNTER — APPOINTMENT (OUTPATIENT)
Dept: UROLOGY | Facility: CLINIC | Age: 61
End: 2021-06-10

## 2021-07-20 ENCOUNTER — RX RENEWAL (OUTPATIENT)
Age: 61
End: 2021-07-20

## 2021-07-23 ENCOUNTER — APPOINTMENT (OUTPATIENT)
Dept: UROLOGY | Facility: CLINIC | Age: 61
End: 2021-07-23

## 2021-09-27 ENCOUNTER — RX RENEWAL (OUTPATIENT)
Age: 61
End: 2021-09-27

## 2021-10-21 ENCOUNTER — RX RENEWAL (OUTPATIENT)
Age: 61
End: 2021-10-21

## 2021-12-28 ENCOUNTER — RX RENEWAL (OUTPATIENT)
Age: 61
End: 2021-12-28

## 2022-04-04 ENCOUNTER — RX RENEWAL (OUTPATIENT)
Age: 62
End: 2022-04-04

## 2022-04-04 RX ORDER — SILDENAFIL 100 MG/1
100 TABLET, FILM COATED ORAL
Qty: 15 | Refills: 0 | Status: ACTIVE | COMMUNITY
Start: 2021-04-06 | End: 1900-01-01

## 2022-04-11 PROBLEM — R37 SEXUAL DYSFUNCTION: Status: ACTIVE | Noted: 2021-04-29

## 2024-03-26 ENCOUNTER — APPOINTMENT (OUTPATIENT)
Dept: UROLOGY | Facility: CLINIC | Age: 64
End: 2024-03-26
Payer: COMMERCIAL

## 2024-03-26 VITALS
HEIGHT: 77 IN | HEART RATE: 52 BPM | RESPIRATION RATE: 17 BRPM | BODY MASS INDEX: 28.93 KG/M2 | WEIGHT: 245 LBS | DIASTOLIC BLOOD PRESSURE: 77 MMHG | TEMPERATURE: 97.9 F | SYSTOLIC BLOOD PRESSURE: 143 MMHG

## 2024-03-26 DIAGNOSIS — R97.20 ELEVATED PROSTATE, SPECIFIC ANTIGEN [PSA]: ICD-10-CM

## 2024-03-26 DIAGNOSIS — N13.8 BENIGN PROSTATIC HYPERPLASIA WITH LOWER URINARY TRACT SYMPMS: ICD-10-CM

## 2024-03-26 DIAGNOSIS — R35.0 FREQUENCY OF MICTURITION: ICD-10-CM

## 2024-03-26 DIAGNOSIS — N40.1 BENIGN PROSTATIC HYPERPLASIA WITH LOWER URINARY TRACT SYMPMS: ICD-10-CM

## 2024-03-26 PROCEDURE — 99215 OFFICE O/P EST HI 40 MIN: CPT

## 2024-03-26 PROCEDURE — 51798 US URINE CAPACITY MEASURE: CPT

## 2024-03-26 NOTE — ASSESSMENT
[FreeTextEntry1] : He is seeing an outside urologist for ED  He does injection for ED  Increase nocturia x several months  He is taking supplements OTC  He has nocturia x 2-3  daytime frequency  PVR is 16  He will stop the testosterone OTC supplement  If his frequency does not respond we will start oxybutinin  He will call me in 2-4 weeks  PSA today

## 2024-03-26 NOTE — HISTORY OF PRESENT ILLNESS
[Urinary Frequency] : urinary frequency [FreeTextEntry1] : Here for a routine urological event . Hx PSA is 3.o but last 2.0

## 2024-03-27 LAB — PSA SERPL-MCNC: 3.49 NG/ML

## 2024-04-18 NOTE — DISCHARGE NOTE ADULT - [id number]_GEN_ALL_CORE
Patient declined second bp check states he has white coat syndrome so anytime he is at a doctors office it is usually elevated Matthew Ramsey was in the clinic today to see Abdirahman Luevano MD for   Chief Complaint   Patient presents with    Surgical Followup   .    Please note, his blood pressures were elevated x2 while in the clinic.    BP Readings from Last 1 Encounters:   04/18/24 0930 (!) 150/83       Please review with PCP and follow up with patient as appropriate.    08-Jul-2018

## 2024-06-21 NOTE — PATIENT PROFILE ADULT. - ALCOHOL USE HISTORY SINGLE SELECT
[facility] Alert Outreach Telephone Call Attempt     Patient is being outreached by the [facility] for a clinical alert from the [facility] monitoring program.     Call Attempt Date: 6/21/2024    Call Attempt: First Attempt   yes...

## 2025-07-31 ENCOUNTER — APPOINTMENT (OUTPATIENT)
Dept: UROLOGY | Facility: CLINIC | Age: 65
End: 2025-07-31

## 2025-08-18 ENCOUNTER — NON-APPOINTMENT (OUTPATIENT)
Age: 65
End: 2025-08-18

## 2025-08-19 ENCOUNTER — APPOINTMENT (OUTPATIENT)
Dept: UROLOGY | Facility: CLINIC | Age: 65
End: 2025-08-19

## 2025-08-19 VITALS
WEIGHT: 245 LBS | RESPIRATION RATE: 17 BRPM | HEIGHT: 77 IN | SYSTOLIC BLOOD PRESSURE: 159 MMHG | BODY MASS INDEX: 28.93 KG/M2 | DIASTOLIC BLOOD PRESSURE: 79 MMHG | TEMPERATURE: 98.4 F | HEART RATE: 54 BPM

## 2025-08-19 DIAGNOSIS — N13.8 BENIGN PROSTATIC HYPERPLASIA WITH LOWER URINARY TRACT SYMPMS: ICD-10-CM

## 2025-08-19 DIAGNOSIS — R97.20 ELEVATED PROSTATE, SPECIFIC ANTIGEN [PSA]: ICD-10-CM

## 2025-08-19 DIAGNOSIS — R35.0 FREQUENCY OF MICTURITION: ICD-10-CM

## 2025-08-19 DIAGNOSIS — N40.1 BENIGN PROSTATIC HYPERPLASIA WITH LOWER URINARY TRACT SYMPMS: ICD-10-CM

## 2025-08-19 PROCEDURE — G2211 COMPLEX E/M VISIT ADD ON: CPT | Mod: NC

## 2025-08-19 PROCEDURE — 99203 OFFICE O/P NEW LOW 30 MIN: CPT
